# Patient Record
Sex: MALE
[De-identification: names, ages, dates, MRNs, and addresses within clinical notes are randomized per-mention and may not be internally consistent; named-entity substitution may affect disease eponyms.]

---

## 2019-04-02 ENCOUNTER — HOSPITAL ENCOUNTER (EMERGENCY)
Dept: HOSPITAL 14 - H.ER | Age: 54
Discharge: ANOTHER HOSPITAL | End: 2019-04-02
Payer: SELF-PAY

## 2019-04-02 ENCOUNTER — HOSPITAL ENCOUNTER (INPATIENT)
Dept: HOSPITAL 31 - C.9I | Age: 54
LOS: 4 days | Discharge: HOME | DRG: 174 | End: 2019-04-06
Attending: FAMILY MEDICINE | Admitting: FAMILY MEDICINE
Payer: COMMERCIAL

## 2019-04-02 VITALS — OXYGEN SATURATION: 100 %

## 2019-04-02 VITALS — HEART RATE: 83 BPM | SYSTOLIC BLOOD PRESSURE: 172 MMHG | RESPIRATION RATE: 20 BRPM | DIASTOLIC BLOOD PRESSURE: 115 MMHG

## 2019-04-02 VITALS — BODY MASS INDEX: 26.6 KG/M2

## 2019-04-02 VITALS — TEMPERATURE: 98.1 F

## 2019-04-02 DIAGNOSIS — I27.20: ICD-10-CM

## 2019-04-02 DIAGNOSIS — I37.1: ICD-10-CM

## 2019-04-02 DIAGNOSIS — E78.5: ICD-10-CM

## 2019-04-02 DIAGNOSIS — R07.89: ICD-10-CM

## 2019-04-02 DIAGNOSIS — I10: ICD-10-CM

## 2019-04-02 DIAGNOSIS — D72.829: ICD-10-CM

## 2019-04-02 DIAGNOSIS — Z79.899: ICD-10-CM

## 2019-04-02 DIAGNOSIS — E87.1: ICD-10-CM

## 2019-04-02 DIAGNOSIS — I25.82: ICD-10-CM

## 2019-04-02 DIAGNOSIS — I21.29: Primary | ICD-10-CM

## 2019-04-02 DIAGNOSIS — Z95.5: ICD-10-CM

## 2019-04-02 DIAGNOSIS — E11.9: ICD-10-CM

## 2019-04-02 DIAGNOSIS — I25.10: ICD-10-CM

## 2019-04-02 DIAGNOSIS — I07.1: ICD-10-CM

## 2019-04-02 DIAGNOSIS — I21.9: Primary | ICD-10-CM

## 2019-04-02 LAB
BASOPHILS # BLD AUTO: 0.1 K/UL (ref 0–0.2)
BASOPHILS NFR BLD: 0.6 % (ref 0–2)
BUN SERPL-MCNC: 20 MG/DL (ref 9–20)
CALCIUM SERPL-MCNC: 10.2 MG/DL (ref 8.4–10.2)
EOSINOPHIL # BLD AUTO: 0.2 K/UL (ref 0–0.7)
EOSINOPHIL NFR BLD: 1.3 % (ref 0–4)
ERYTHROCYTE [DISTWIDTH] IN BLOOD BY AUTOMATED COUNT: 13.6 % (ref 11.5–14.5)
GFR NON-AFRICAN AMERICAN: > 60
HGB BLD-MCNC: 15.7 G/DL (ref 12–18)
LYMPHOCYTES # BLD AUTO: 3.3 K/UL (ref 1–4.3)
LYMPHOCYTES NFR BLD AUTO: 21.7 % (ref 20–40)
MCH RBC QN AUTO: 30.6 PG (ref 27–31)
MCHC RBC AUTO-ENTMCNC: 33.8 G/DL (ref 33–37)
MCV RBC AUTO: 90.5 FL (ref 80–94)
MONOCYTES # BLD: 1.1 K/UL (ref 0–0.8)
MONOCYTES NFR BLD: 7.6 % (ref 0–10)
NEUTROPHILS # BLD: 10.4 K/UL (ref 1.8–7)
NEUTROPHILS NFR BLD AUTO: 68.8 % (ref 50–75)
NRBC BLD AUTO-RTO: 0.1 % (ref 0–0)
PLATELET # BLD: 263 K/UL (ref 130–400)
PMV BLD AUTO: 8.4 FL (ref 7.2–11.7)
RBC # BLD AUTO: 5.14 MIL/UL (ref 4.4–5.9)
WBC # BLD AUTO: 15.1 K/UL (ref 4.8–10.8)

## 2019-04-02 PROCEDURE — 71045 X-RAY EXAM CHEST 1 VIEW: CPT

## 2019-04-02 PROCEDURE — 82948 REAGENT STRIP/BLOOD GLUCOSE: CPT

## 2019-04-02 PROCEDURE — 93005 ELECTROCARDIOGRAM TRACING: CPT

## 2019-04-02 PROCEDURE — 4A023N7 MEASUREMENT OF CARDIAC SAMPLING AND PRESSURE, LEFT HEART, PERCUTANEOUS APPROACH: ICD-10-PCS | Performed by: INTERNAL MEDICINE

## 2019-04-02 PROCEDURE — 85025 COMPLETE CBC W/AUTO DIFF WBC: CPT

## 2019-04-02 PROCEDURE — 99284 EMERGENCY DEPT VISIT MOD MDM: CPT

## 2019-04-02 PROCEDURE — 02703D6 DILATION OF CORONARY ARTERY, ONE ARTERY, BIFURCATION, WITH INTRALUMINAL DEVICE, PERCUTANEOUS APPROACH: ICD-10-PCS | Performed by: INTERNAL MEDICINE

## 2019-04-02 PROCEDURE — 84484 ASSAY OF TROPONIN QUANT: CPT

## 2019-04-02 PROCEDURE — 80048 BASIC METABOLIC PNL TOTAL CA: CPT

## 2019-04-02 RX ADMIN — EPTIFIBATIDE SCH MLS/HR: 0.75 INJECTION, SOLUTION INTRAVENOUS at 23:30

## 2019-04-02 RX ADMIN — NITROGLYCERIN SCH MLS/HR: 200 INJECTION, SOLUTION INTRAVENOUS at 23:49

## 2019-04-02 NOTE — ED PDOC
HPI: Chest Pain


Time Seen by Provider: 19 19:21


Chief Complaint (Nursing): Chest Pain


Chief Complaint (Provider): Chest Pain


History Per: Patient


History/Exam Limitations: no limitations


Onset/Duration Of Symptoms: Days (x 1 month)


Current Symptoms Are (Timing): Still Present


Quality: "Pain"


Additional Complaint(s): 


54 year old male with HTN presents to the ED for evaluation of one month of 

chest pain. Patient was initially saying there was no change to chest pain. 

However, on further questioning, he reports pain is radiating to his back and 

admits to feel short of breath. Patient does not know the name of his HTN 

medications or the name of his doctor. No nausea, vomiting, sweats and radiation

to jaw, neck or arms. 














Past Medical History


Reviewed: Historical Data, Nursing Documentation, Vital Signs


Vital Signs: 





                                Last Vital Signs











Temp  98.1 F   19 18:50


 


Pulse  88   19 20:15


 


Resp  18   19 18:50


 


BP  177/99 H  19 20:15


 


Pulse Ox  100   19 20:15














- Medical History


PMH: HTN





- Surgical History


Surgical History: No Surg Hx





- Family History


Family History: States: Unknown Family Hx





- Allergies


Allergies/Adverse Reactions: 


                                    Allergies











Allergy/AdvReac Type Severity Reaction Status Date / Time


 


No Known Allergies Allergy   Verified 19 18:50














Review of Systems


ROS Statement: Except As Marked, All Systems Reviewed And Found Negative


Constitutional: Negative for: Fever


Cardiovascular: Positive for: Chest Pain


Respiratory: Positive for: Shortness of Breath


Gastrointestinal: Negative for: Nausea, Vomiting, Abdominal Pain


Musculoskeletal: Positive for: Back Pain.  Negative for: Neck Pain, Arm Pain





Physical Exam





- Reviewed


Nursing Documentation Reviewed: Yes


Vital Signs Reviewed: Yes





- Physical Exam


Appears: Positive for: Well, Non-toxic, No Acute Distress


Head Exam: Positive for: ATRAUMATIC, NORMAL INSPECTION, NORMOCEPHALIC


Skin: Positive for: Normal Color, Warm, Dry


Eye Exam: Positive for: EOMI, Normal appearance, PERRL


Cardiovascular/Chest: Positive for: Regular Rate, Rhythm.  Negative for: Gallop,

Murmur, Friction Rub


Respiratory: Positive for: Normal Breath Sounds.  Negative for: Wheezing, 

Respiratory Distress


Pulses-Radial (L): 2+


Pulses-Radial (R): 2+


Gastrointestinal/Abdominal: Positive for: Normal Exam, Soft.  Negative for: 

Tenderness


Extremity: Positive for: Normal ROM (x 4).  Negative for: Deformity


Neurological/Psych: Positive for: Awake, Alert, Normal Tone, Oriented (x 3).  

Negative for: Motor/Sensory Deficits





- Laboratory Results


Result Diagrams: 


                                 19 20:00





                                 19 20:00


Lab Results: 





                                        











Troponin I  37.8000 ng/mL (0.00-0.120)  H*  19  20:00    














- ECG


O2 Sat by Pulse Oximetry: 100 (RA)


Pulse Ox Interpretation: Normal





- Core Measure


Core Measure Indicators: Code Heart





- Critical Care


Total Time (In Min): 60


Documented Critical Care: Time excludes all time spent performint seperately 

billable procedures





Medical Decision Making


Medical Decision Makin:23 


MDM: cardiac workup


Initial EKG showing ST elevation V2, V3 without reciprocal change. 








20:50 


Spoke to Dr. Banerjee. Sending an image of the first EKG done at 18:46. It was read

as NSR without ST/T changes. However, there is mild ST elevation.


Obtaining repeat EKG. 





20:54 


Repeat EKG shows elevation and labs reveal troponin of 37.8. 





21:01 


Code Heart activated. Dr. Malcolm called. 





21:13 


Dr. Malcolm returned call; States he is going to review the images and call back. 







21:22 


Initially, Dr. Malcolm was not accepting patient for admission because the patient

already infarcted. However, Dr. Banerjee disagrees and states he will call Dr. Hector shultz directly as he still feels transfer is necessary. Will wait to hear back from 

either provider. 





21:25 


Dr. Banerjee returned call; advised to hold Plavix and Heparin. Patient was already

given aspirin in the ED and will go directly to the catheterization lab.





---------------------------------------

----------------------------------------------------------


Scribe Attestation:


Documented by Robina Alan, acting as a scribe Denis Pace MD 





Provider Scribe Attestation:


All medical record entries made by the Scribe were at my direction and 

personally dictated by me. I have reviewed the chart and agree that the record 

accurately reflects my personal performance of the history, physical exam, 

medical decision making, and the department course for this patient. I have also

personally directed, reviewed, and agree with the discharge instructions and 

disposition. 





Disposition





- Clinical Impression


Clinical Impression: 


 Myocardial infarct, Chest pain








- Patient ED Disposition


Is Patient to be Admitted: Transfer of Care





- Disposition


Disposition: Other Institution ([facility] cardiac catheterization)


Disposition Time: 21:25


Condition: STABLE


Forms:  [facility] (English)

## 2019-04-02 NOTE — CP.PCM.PN
<Susana Alberto P - Last Filed: 04/03/19 04:50>





Subjective





- Date & Time of Evaluation


Date of Evaluation: 04/02/19


Time of Evaluation: 21:45





- Subjective


Subjective: 


House doctor note.





Code heart transfer from [place]. Patient arrived on site at 9:33PM. Vitals: 

189/111, 87 HR, 97% SpO2. Patient complains of 5/10 left sided chest pain. Given

Aspirin 325mg and HCTZ 25mg in [place] ED. Once arrived, orders were placed for 

Brillenta 180mg PO, Heparin bolus 4000mg IV and Heparin drip. Nitroglycerin drip

@ 50mcg initiated.  Dr. Mas arrived at Cath lab 10:13. Cath showed occlusion 

of LAD. Patient accepted to ICU.





HPI: 54 year old male with L chest pain for one month. Worsens with exertion and

at night. Associated symptoms include shortness of breath. Denies 

lightheadedness, radiation of pain, nausea, vomiting, diaphoresis, syncope, 

abdominal pain, and any other symptoms. Patient was seen at a clinic a few days 

ago, was diagnosed with HTN and prescribed BP medication, which he began taking.

Chest pain severely worsened today which prompted him to go to [place] ED. EKG 

showed ST changes. Troponin was 37.8. 





PMHx: HTN


PSHx: Denies


Meds: Unknown BP med


Allergies: NKDA


Social: occassional alcohol use, denies tobacco and illicit drugs. Works as a 

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


Family Hx: Denies hx of CAD, HTN, MI, DM, stroke and cancer























Objective





- Constitutional


Appears: Non-toxic





- Head Exam


Head Exam: ATRAUMATIC, NORMOCEPHALIC





- Eye Exam


Eye Exam: EOMI, Normal appearance, PERRL





- ENT Exam


ENT Exam: Mucous Membranes Moist





- Neck Exam


Neck Exam: Full ROM, Normal Inspection





- Respiratory Exam


Respiratory Exam: Clear to Ausculation Bilateral.  absent: Rales, Rhonchi, 

Wheezes





- Cardiovascular Exam


Cardiovascular Exam: REGULAR RHYTHM, +S1, +S2





- GI/Abdominal Exam


GI & Abdominal Exam: Soft, Normal Bowel Sounds.  absent: Guarding, Rigid, 

Tenderness, Rebound





- Extremities Exam


Extremities Exam: Full ROM, Normal Capillary Refill, Normal Inspection.  absent:

Pedal Edema, Tenderness





- Neurological Exam


Neurological Exam: Alert, Awake, CN II-XII Intact, Oriented x3





- Psychiatric Exam


Psychiatric exam: Normal Affect, Normal Mood





- Skin


Skin Exam: Dry, Intact, Normal Color





<Ke De Oliveira P - Last Filed: 04/03/19 08:27>





Objective





- Vital Signs/Intake and Output


Vital Signs (last 24 hours): 


                                        











Temp Pulse Resp BP Pulse Ox


 


 99.1 F   82   17   114/72   96 


 


 04/03/19 08:00  04/03/19 08:05  04/03/19 08:05  04/03/19 08:05  04/03/19 08:05








Intake and Output: 


                                        











 04/03/19 04/03/19





 06:59 18:59


 


Intake Total 177.2 45.6


 


Output Total 1200 


 


Balance -1022.8 45.6














- Medications


Medications: 


                               Current Medications





Acetaminophen (Tylenol 325mg Tab)  650 mg PO Q6 PRN


   PRN Reason: Fever >100.4 F


Aspirin (Aspirin Chewable)  81 mg PO DAILY [facility]


Famotidine (Pepcid)  20 mg PO BID [facility]


   Last Admin: 04/02/19 23:50 Dose:  20 mg


Nitroglycerin/Dextrose (Nitroglycerin 50 Mg/250 Ml D5w)  50 mg in 250 mls @ 15 

mls/hr IV .[id number] [facility]; Protocol


   Last Titration: 04/03/19 07:40 Dose:  0 mcg/min, 0 mls/hr


Eptifibatide (Integrilin)  75 mg in 100 mls @ 11.612 mls/hr IV .Q8H37M [facility]


   Stop: 04/03/19 23:30


   Last Admin: 04/03/19 06:30 Dose:  11.612 mls/hr


Lisinopril (Zestril)  2.5 mg PO DAILY [facility]


Metoprolol Tartrate (Lopressor)  12.5 mg PO Q12 [facility]


   Last Admin: 04/02/19 23:50 Dose:  12.5 mg


Rosuvastatin Calcium (Crestor)  20 mg PO HS [facility]


Ticagrelor (Brilinta)  90 mg PO BID [facility]











- Labs


Labs: 


                                        





                                 04/03/19 05:52 





                                 04/03/19 05:52 











Attending/Attestation





- Attestation


I have personally seen and examined this patient.: Yes


I have fully participated in the care of the patient.: Yes


I have reviewed all pertinent clinical information, including history, physical 

exam and plan: Yes


Notes (Text): 





04/03/19 08:27


Patient seen during code heart transfer form [facility], and other details in the HPI 

note as well.

## 2019-04-02 NOTE — CP.PCM.CON
History of Present Illness





- History of Present Illness


History of Present Illness: 


CCM


55 yo male with hx HTN had chest pain x 1 month and went to [facility]. Pt

found with EKG changes and elevated trop. Transferred to  [facility]. 

Cardiac Cath done showing 100% LAD and pt received BMS to LAD. Pt has been given

asa /brilinta /morphine and is on integrilin drip. Pt pain free when seen. 

Denied sob /n /v /cough. 


ROS- as noted


All- NKDA


Social Etoh occ/ No tob or drugs


Meds- reviewed


FH- Unknown





PE  T-98.1     P-98  R-16  BP-138/97


Awake ,responsive, nad


Neck- no jvd


Lungs- bilat bs


Heart-rr


aBd-benign


eXt- no edema, pulses intact bilat





Labs-pending


A&P


STEMI


s/p Cardiac Cath /PCI /BMS to LAD


HTN





cont asa/Brilinta/ Integrilin 24 hrs per Cardiology


start Beta blocker /ACE


ECHO


Cardiology f/u


check labs /CXR


DVT prophylaxis


maintain optimal lytes








Past Patient History





- Past Social History


Smoking Status: Never Smoked





- CARDIAC


Hx Hypertension: Yes





- PSYCHIATRIC


Hx Substance Use: No





Meds


Allergies/Adverse Reactions: 


                                    Allergies











Allergy/AdvReac Type Severity Reaction Status Date / Time


 


No Known Allergies Allergy   Verified 04/02/19 18:50














- Medications


Medications: 


                               Current Medications





Heparin Sodium/Sodium Chloride (Heparin 18967 Units/250ml 1/2 Normal Saline)  

25,000 units in 250 mls @ 8.709 mls/hr IV .Q24H [facility]N; Protocol


   PRN Reason: ADJUST RATE PER PROTOCOL


Nitroglycerin/Dextrose (Nitroglycerin 50 Mg/250 Ml D5w)  50 mg in 250 mls @ 15 

mls/hr IV .[id number] [facility]; Protocol











Assessment & Plan


(1) Myocardial infarct


Status: Acute   





(2) HTN (hypertension)


Status: Chronic

## 2019-04-03 LAB
ALBUMIN SERPL-MCNC: 4.4 G/DL (ref 3.5–5)
ALBUMIN/GLOB SERPL: 1.4 {RATIO} (ref 1–2.1)
ALT SERPL-CCNC: 93 U/L (ref 21–72)
AST SERPL-CCNC: 669 U/L (ref 17–59)
BASOPHILS # BLD AUTO: 0.1 K/UL (ref 0–0.2)
BASOPHILS NFR BLD: 0.5 % (ref 0–2)
BUN SERPL-MCNC: 15 MG/DL (ref 9–20)
CALCIUM SERPL-MCNC: 9 MG/DL (ref 8.6–10.4)
EOSINOPHIL # BLD AUTO: 0 K/UL (ref 0–0.7)
EOSINOPHIL NFR BLD: 0.2 % (ref 0–4)
ERYTHROCYTE [DISTWIDTH] IN BLOOD BY AUTOMATED COUNT: 13.3 % (ref 11.5–14.5)
GFR NON-AFRICAN AMERICAN: > 60
HDLC SERPL-MCNC: 42 MG/DL (ref 30–70)
HGB BLD-MCNC: 14.1 G/DL (ref 12–18)
LDLC SERPL-MCNC: 141 MG/DL (ref 0–129)
LYMPHOCYTES # BLD AUTO: 2.3 K/UL (ref 1–4.3)
LYMPHOCYTES NFR BLD AUTO: 16.6 % (ref 20–40)
MCH RBC QN AUTO: 31.5 PG (ref 27–31)
MCHC RBC AUTO-ENTMCNC: 34.5 G/DL (ref 33–37)
MCV RBC AUTO: 91.1 FL (ref 80–94)
MONOCYTES # BLD: 1.2 K/UL (ref 0–0.8)
MONOCYTES NFR BLD: 8.4 % (ref 0–10)
NEUTROPHILS # BLD: 10.5 K/UL (ref 1.8–7)
NEUTROPHILS NFR BLD AUTO: 74.3 % (ref 50–75)
NRBC BLD AUTO-RTO: 0.1 % (ref 0–2)
PLATELET # BLD: 251 K/UL (ref 130–400)
PMV BLD AUTO: 8.5 FL (ref 7.2–11.7)
RBC # BLD AUTO: 4.48 MIL/UL (ref 4.4–5.9)
WBC # BLD AUTO: 14.2 K/UL (ref 4.8–10.8)

## 2019-04-03 RX ADMIN — INSULIN ASPART SCH UNIT: 100 INJECTION, SOLUTION INTRAVENOUS; SUBCUTANEOUS at 17:19

## 2019-04-03 RX ADMIN — INSULIN ASPART SCH: 100 INJECTION, SOLUTION INTRAVENOUS; SUBCUTANEOUS at 21:30

## 2019-04-03 RX ADMIN — EPTIFIBATIDE SCH: 0.75 INJECTION, SOLUTION INTRAVENOUS at 08:47

## 2019-04-03 RX ADMIN — EPTIFIBATIDE SCH MLS/HR: 0.75 INJECTION, SOLUTION INTRAVENOUS at 06:30

## 2019-04-03 RX ADMIN — NITROGLYCERIN SCH: 200 INJECTION, SOLUTION INTRAVENOUS at 19:46

## 2019-04-03 RX ADMIN — INSULIN ASPART SCH UNIT: 100 INJECTION, SOLUTION INTRAVENOUS; SUBCUTANEOUS at 11:57

## 2019-04-03 RX ADMIN — EPTIFIBATIDE SCH: 0.75 INJECTION, SOLUTION INTRAVENOUS at 19:48

## 2019-04-03 RX ADMIN — EPTIFIBATIDE SCH MLS/HR: 0.75 INJECTION, SOLUTION INTRAVENOUS at 13:22

## 2019-04-03 NOTE — CP.PCM.HP
<Susana Alberto P - Last Filed: 04/03/19 07:03>





History of Present Illness





- History of Present Illness


History of Present Illness: 





H&P for Dr. De Oliveira





HPI: 54 year old male with L chest pain for one month. Worsens with exertion and

at night. Associated symptoms include shortness of breath. Denies 

lightheadedness, radiation of pain, nausea, vomiting, diaphoresis, syncope, 

abdominal pain, and any other symptoms. Patient was seen at a clinic a few days 

ago, was diagnosed with HTN and prescribed BP medication, which he began taking.

Chest pain severely worsened today which prompted him to go to [place] ED. EKG 

showed ST changes. Troponin was 37.8. Underwent Cardiac cath with Dr. Mas, 

which showed 100% LAD occlusion.





PMHx: HTN


PSHx: Denies


Meds: Unknown BP med


Allergies: NKDA


Social: occasional alcohol use, denies tobacco and illicit drugs. Works as a 

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


Family Hx: Denies hx of CAD, HTN, MI, DM, stroke and cancer





Review of Systems:


-Gen: No fever, No chills, No headache, No lethargy, No weakness.


-HEENT: No dizziness, No change in vision, No change in hearing, No sore throat,

No dysphagia, No nasal congestion, No mucous.


-Cardio: + chest pain, No palpitations, No lower extremity edema, No orthopnea.


-Resp: No  cough, + dyspnea, No hemoptysis, No wheezing, No pain on inspiration.


-GI: No abdominal pain, No nausea/vomiting, No diarrhea/constipation, No 

hematochezia, No hematemesis.


-: No dysuria, No urinary freq, No incontinence, No hematuria, No change in 

urinary stream.


-MSK: No back pain, No muscle weakness, No radiating pain.


-Skin: No itching, No rash, No lesions.


-Neuro: No confusion, No numbness, No tingling, No focal weakness, No radicular 

pain, No syncope.





Present on Admission





- Present on Admission


Any Indicators Present on Admission: No





Past Patient History





- Past Medical History & Family History


Past Medical History?: Yes





- Past Social History


Smoking Status: Never Smoked





- CARDIAC


Hx Hypertension: Yes





- PULMONARY


Hx Respiratory Disorders: No





- NEUROLOGICAL


Hx Neurological Disorder: No





- HEENT


Hx HEENT Problems: No





- RENAL


Hx Chronic Kidney Disease: No





- ENDOCRINE/METABOLIC


Hx Endocrine Disorders: No





- HEMATOLOGICAL/ONCOLOGICAL


Hx Blood Disorders: No





- INTEGUMENTARY


Hx Dermatological Problems: No





- MUSCULOSKELETAL/RHEUMATOLOGICAL


Hx Musculoskeletal Disorders: No





- GASTROINTESTINAL


Hx Gastrointestinal Disorders: No





- GENITOURINARY/GYNECOLOGICAL


Hx Genitourinary Disorders: No





- PSYCHIATRIC


Hx Substance Use: No





- SURGICAL HISTORY


Hx Surgeries: No





- ANESTHESIA


Hx Anesthesia: Yes


Hx Anesthesia Reactions: No


Hx Malignant Hyperthermia: No


Has any member of the family had a problem w/ anesthesia?: No





Meds


Allergies/Adverse Reactions: 


                                    Allergies











Allergy/AdvReac Type Severity Reaction Status Date / Time


 


No Known Allergies Allergy   Verified 04/02/19 18:50














Physical Exam





- Additional Findings


Additional findings: 


- Constitutional


Appears: Non-toxic





- Head Exam


Head Exam: ATRAUMATIC, NORMOCEPHALIC





- Eye Exam


Eye Exam: EOMI, Normal appearance, PERRL





- ENT Exam


ENT Exam: Mucous Membranes Moist





- Neck Exam


Neck Exam: Full ROM, Normal Inspection





- Respiratory Exam


Respiratory Exam: Clear to Ausculation Bilateral.  absent: Rales, Rhonchi, 

Wheezes





- Cardiovascular Exam


Cardiovascular Exam: REGULAR RHYTHM, +S1, +S2





- GI/Abdominal Exam


GI & Abdominal Exam: Soft, Normal Bowel Sounds.  absent: Guarding, Rigid, 

Tenderness, Rebound





- Extremities Exam


Extremities Exam: Full ROM, Normal Capillary Refill, Normal Inspection.  absent:

Pedal Edema, Tenderness





- Neurological Exam


Neurological Exam: Alert, Awake, CN II-XII Intact, Oriented x3





- Psychiatric Exam


Psychiatric exam: Normal Affect, Normal Mood





- Skin


Skin Exam: Dry, Intact, Normal Color








Results





- Vital Signs


Recent Vital Signs: 





                                Last Vital Signs











Temp  98.7 F   04/02/19 23:45


 


Pulse  87   04/02/19 23:49


 


Resp  20   04/02/19 23:49


 


BP  138/97 H  04/02/19 23:49


 


Pulse Ox  97   04/02/19 23:49














- Labs


Result Diagrams: 


                                 04/03/19 05:52





                                 04/03/19 05:52





Assessment & Plan





- Assessment and Plan (Free Text)


Plan: 


STEMI s/p cardiac cath


% occlusion of LAD


f/u Echo


f/u troponin


Tylenol 650mg PO Q6H PRN 


Aspirin 81mg PO daily 


Crestor 20mg PO HS 


Brillinta 90mg PO BID





HTN


Metoprolol 12.5 PO Q12H 


Nitro drip 


Lisinopril 2.5mg PO daily 





PPx


Pepcid 20mg BId 


Brillinta 90mg PO BID





Case discussed with Dr. Yennifer Alberto, PGY-1


 





<Ke De Oliveira - Last Filed: 04/03/19 08:09>





Results





- Vital Signs


Recent Vital Signs: 





                                Last Vital Signs











Temp  98.7 F   04/03/19 04:30


 


Pulse  70   04/03/19 07:04


 


Resp  19   04/03/19 07:04


 


BP  101/60   04/03/19 07:04


 


Pulse Ox  93 L  04/03/19 07:04














- Labs


Result Diagrams: 


                                 04/03/19 05:52





                                 04/03/19 05:52


Labs: 





                         Laboratory Results - last 24 hr











  04/03/19 04/03/19





  05:52 05:52


 


WBC  14.2 H 


 


RBC  4.48 


 


Hgb  14.1 


 


Hct  40.8 


 


MCV  91.1 


 


MCH  31.5 H 


 


MCHC  34.5 


 


RDW  13.3 


 


Plt Count  251 


 


MPV  8.5 


 


Neut % (Auto)  74.3 


 


Lymph % (Auto)  16.6 L 


 


Mono % (Auto)  8.4 


 


Eos % (Auto)  0.2 


 


Baso % (Auto)  0.5 


 


Neut # (Auto)  10.5 H 


 


Lymph # (Auto)  2.3 


 


Mono # (Auto)  1.2 H 


 


Eos # (Auto)  0.0 


 


Baso # (Auto)  0.1 


 


Sodium   130 L


 


Potassium   3.0 L


 


Chloride   94 L


 


Carbon Dioxide   27


 


Anion Gap   12


 


BUN   15


 


Creatinine   0.7 L


 


Est GFR ( Amer)   > 60


 


Est GFR (Non-Af Amer)   > 60


 


Random Glucose   149 H


 


Calcium   9.0


 


Phosphorus   4.3


 


Magnesium   1.7


 


Total Bilirubin   1.0


 


AST   669 H


 


ALT   93 H


 


Alkaline Phosphatase   96


 


Troponin I   139.0000 H*


 


Total Protein   7.5


 


Albumin   4.4


 


Globulin   3.1


 


Albumin/Globulin Ratio   1.4


 


Triglycerides   143


 


Cholesterol   210 H


 


LDL Cholesterol Direct   141 H


 


HDL Cholesterol   42














Attending/Attestation





- Attestation


I have personally seen and examined this patient.: Yes


I have fully participated in the care of the patient.: Yes


I have reviewed all pertinent clinical information: Yes


Notes (Text): 





04/03/19 08:06


Assessment/Plan


Anterioseptal STEMI with also RCA lession, s/p bare metal pci, on integrillin 

for 24 hr due to osteal LAD disease, ASA, brillianta, crestor, metoprolol, echo,

admitted to ICU, staged intervention depending on the RCA disease.

## 2019-04-03 NOTE — CARD
--------------- APPROVED REPORT --------------





Date of service: 04/02/2019



EKG Measurement

Heart [id number]

IA [id number]

[id number]

[id number]

[id number]



<Conclusion>

Normal sinus rhythm

Normal ECG

## 2019-04-03 NOTE — CP.CCUPN
<Wander,Adonis - Last Filed: 04/03/19 12:07>





CCU Subjective





- Physician Review


Subjective (Free Text): 





04/03/19 07:34





S/p Cardiac Cath yesterday. No acute events overnight. Pt seen and examined this

morning. Denies chest discomfort, palpitations, lightheadedness.  





Critical Care Time Spent (in minutes): 35





CCU Objective





- Vital Signs / Intake & Output


Vital Signs (Last 4 hours): 


Vital Signs











  Temp Pulse Resp BP Pulse Ox


 


 04/03/19 07:04   70  19  101/60  93 L


 


 04/03/19 07:00   72  20   95


 


 04/03/19 06:34     104/65 


 


 04/03/19 06:30   73  20  104/65 


 


 04/03/19 06:04   78  15  112/70  95


 


 04/03/19 06:00   78  18   98


 


 04/03/19 05:34   68  34 H  104/59 L  97


 


 04/03/19 05:33   69  35 H  115/60  97


 


 04/03/19 05:30   76  21  104/59 L 


 


 04/03/19 05:05     121/69 


 


 04/03/19 05:00   73  19   97


 


 04/03/19 04:34   74  13  105/69  97


 


 04/03/19 04:30  98.7 F  72  24  105/69 


 


 04/03/19 04:05   74  22  114/71  98


 


 04/03/19 04:00  98.5 F  74  26 H  93/53 L  98











Intake and Output (Last 8hrs): 


                                 Intake & Output











 04/02/19 04/03/19 04/03/19





 22:59 06:59 14:59


 


Intake Total  177.2 17.6


 


Output Total  1200 


 


Balance  -1022.8 17.6


 


Weight 160 lb 163 lb 0.9 oz 


 


Intake:   


 


  IV  42 


 


  Intake, IV Amount  135.2 17.6


 


    Left Forearm  54 6


 


    left arm  81.2 11.6


 


Output:   


 


  Urine  1200 


 


    Urine, Voided  1200 


 


Other:   


 


  Voiding Method  Urinal 


 


  # Voids   


 


    Urine, Voided  1 0


 


  # Bowel Movements  0 0














- Physical Exam


Physical Exam Limitations: Negative for: Altered Mental Status


Pupils: Positive for: PERRL


Extroacular Muscles: Positive for: EOMI


Mouth: Positive for: Moist Mucous Membranes


Neck: Negative for: JVD


Respiratory/Chest: Positive for: Clear to Auscultation.  Negative for: 

Respiratory Distress, Accessory Muscle Use, Wheezes, Rales, Retracting, Rhonchi


Cardiovascular: Positive for: Regular Rate and Rhythm, Normal S1, S2.  Negative 

for: Murmurs


Abdomen: Positive for: Normal Bowel Sounds.  Negative for: Tenderness, 

Distention, Guarding


Lower Extremity: Positive for: Normal Inspection, NORMAL PULSES, Neurovascularly

Intact, Capillary Refill < 2 s.  Negative for: Edema, Erythema, Temperature 

Abnormalties


Neurological: Positive for: Speech Normal


Skin: Positive for: Normal Color


Psychiatric: Positive for: Alert, Oriented x 3





- Medications


Active Medications: 


Active Medications











Generic Name Dose Route Start Last Admin





  Trade Name Freq  PRN Reason Stop Dose Admin


 


Acetaminophen  650 mg  04/02/19 23:44  





  Tylenol 325mg Tab  PO   





  Q6 PRN   





  Fever >100.4 F   





     





     





     


 


Aspirin  81 mg  04/03/19 10:00  





  Aspirin Chewable  PO   





  DAILY SHAGGY   





     





     





     





     


 


Famotidine  20 mg  04/02/19 23:45  04/02/19 23:50





  Pepcid  PO   20 mg





  BID SHAGGY   Administration





     





     





     





     


 


Nitroglycerin/Dextrose  50 mg in 250 mls @ 15 mls/hr  04/02/19 22:00  04/03/19 

04:00





  Nitroglycerin 50 Mg/250 Ml D5w  IV   20 mcg/min





  .[id number] SHAGGY   6 mls/hr





     Titration





     





  Protocol   





  50 MCG/MIN   


 


Eptifibatide  75 mg in 100 mls @ 11.612 mls/hr  04/02/19 23:30  04/03/19 06:30





  Integrilin  IV  04/03/19 23:30  11.612 mls/hr





  .Q8H37M SHAGGY   Administration





     





     





     





  2 MCG/KG/MIN   


 


Lisinopril  2.5 mg  04/03/19 10:00  





  Zestril  PO   





  DAILY SHAGGY   





     





     





     





     


 


Metoprolol Tartrate  12.5 mg  04/02/19 23:45  04/02/19 23:50





  Lopressor  PO   12.5 mg





  Q12 SHAGGY   Administration





     





     





     





     


 


Rosuvastatin Calcium  20 mg  04/03/19 22:00  





  Crestor  PO   





  HS SHAGGY   





     





     





     





     


 


Ticagrelor  90 mg  04/03/19 10:00  





  Brilinta  PO   





  BID SHAGGY   





     





     





     





     














- Patient Studies


Lab Studies: 


                                   Lab Studies











  04/03/19 04/03/19 Range/Units





  05:52 05:52 


 


WBC   14.2 H  (4.8-10.8)  K/uL


 


RBC   4.48  (4.40-5.90)  Mil/uL


 


Hgb   14.1  (12.0-18.0)  g/dL


 


Hct   40.8  (35.0-51.0)  %


 


MCV   91.1  (80.0-94.0)  fL


 


MCH   31.5 H  (27.0-31.0)  pg


 


MCHC   34.5  (33.0-37.0)  g/dL


 


RDW   13.3  (11.5-14.5)  %


 


Plt Count   251  (130-400)  K/uL


 


MPV   8.5  (7.2-11.7)  fL


 


Neut % (Auto)   74.3  (50.0-75.0)  %


 


Lymph % (Auto)   16.6 L  (20.0-40.0)  %


 


Mono % (Auto)   8.4  (0.0-10.0)  %


 


Eos % (Auto)   0.2  (0.0-4.0)  %


 


Baso % (Auto)   0.5  (0.0-2.0)  %


 


Neut # (Auto)   10.5 H  (1.8-7.0)  K/uL


 


Lymph # (Auto)   2.3  (1.0-4.3)  K/uL


 


Mono # (Auto)   1.2 H  (0.0-0.8)  K/uL


 


Eos # (Auto)   0.0  (0.0-0.7)  K/uL


 


Baso # (Auto)   0.1  (0.0-0.2)  K/uL


 


Sodium  130 L   (132-148)  mmol/L


 


Potassium  3.0 L   (3.6-5.2)  mmol/L


 


Chloride  94 L   ()  mmol/L


 


Carbon Dioxide  27   (22-30)  mmol/L


 


Anion Gap  12   (10-20)  


 


BUN  15   (9-20)  mg/dL


 


Creatinine  0.7 L   (0.8-1.5)  mg/dL


 


Est GFR (African Amer)  > 60   


 


Est GFR (Non-Af Amer)  > 60   


 


Random Glucose  149 H   ()  mg/dL


 


Calcium  9.0   (8.6-10.4)  mg/dl


 


Phosphorus  4.3   (2.5-4.5)  mg/dL


 


Magnesium  1.7   (1.6-2.3)  mg/dL


 


Total Bilirubin  1.0   (0.2-1.3)  mg/dL


 


AST  669 H   (17-59)  U/L


 


ALT  93 H   (21-72)  U/L


 


Alkaline Phosphatase  96   ()  U/L


 


Troponin I  139.0000 H*   (0.00-0.120)  ng/mL


 


Total Protein  7.5   (6.3-8.3)  g/dL


 


Albumin  4.4   (3.5-5.0)  g/dL


 


Globulin  3.1   (2.2-3.9)  gm/dL


 


Albumin/Globulin Ratio  1.4   (1.0-2.1)  


 


Triglycerides  143   (0-149)  mg/dL


 


Cholesterol  210 H   (0-199)  mg/dL


 


LDL Cholesterol Direct  141 H   (0-129)  mg/dL


 


HDL Cholesterol  42   (30-70)  mg/dL








                         Laboratory Results - last 24 hr











  04/03/19 04/03/19





  05:52 05:52


 


WBC  14.2 H 


 


RBC  4.48 


 


Hgb  14.1 


 


Hct  40.8 


 


MCV  91.1 


 


MCH  31.5 H 


 


MCHC  34.5 


 


RDW  13.3 


 


Plt Count  251 


 


MPV  8.5 


 


Neut % (Auto)  74.3 


 


Lymph % (Auto)  16.6 L 


 


Mono % (Auto)  8.4 


 


Eos % (Auto)  0.2 


 


Baso % (Auto)  0.5 


 


Neut # (Auto)  10.5 H 


 


Lymph # (Auto)  2.3 


 


Mono # (Auto)  1.2 H 


 


Eos # (Auto)  0.0 


 


Baso # (Auto)  0.1 


 


Sodium   130 L


 


Potassium   3.0 L


 


Chloride   94 L


 


Carbon Dioxide   27


 


Anion Gap   12


 


BUN   15


 


Creatinine   0.7 L


 


Est GFR ( Amer)   > 60


 


Est GFR (Non-Af Amer)   > 60


 


Random Glucose   149 H


 


Calcium   9.0


 


Phosphorus   4.3


 


Magnesium   1.7


 


Total Bilirubin   1.0


 


AST   669 H


 


ALT   93 H


 


Alkaline Phosphatase   96


 


Troponin I   139.0000 H*


 


Total Protein   7.5


 


Albumin   4.4


 


Globulin   3.1


 


Albumin/Globulin Ratio   1.4


 


Triglycerides   143


 


Cholesterol   210 H


 


LDL Cholesterol Direct   141 H


 


HDL Cholesterol   42











EKG/Cardiology Studies: 


Cardiology / EKG Studies





04/02/19 21:50


EKG [ELECTROCARDIOGRAM] Stat 


   Comment: 


   Mode Of Transportation: 


   Reason For Exam: chest pain





04/03/19 07:36


EKG [ELECTROCARDIOGRAM] Routine 


   Comment: 


   Mode Of Transportation: BED


   Reason For Exam: post stent proximal LAD














Critical Care Progress Note





- Nutrition


Nutrition: 


                                    Nutrition











 Category Date Time Status


 


 Heart Healthy Diet [DIET] Diets  04/03/19 Breakfast Active














Assessment/Plan





- Assessment and Plan (Free Text)


Assessment: 


54 year old male with hx of HTN (newly diagnosed), transferred from [place] 

[facility]. Pt had presented to [facility] ER with worsening chest pain ongoing for 

the past month associated with sob. Was noted on EKG to have ST elevated 

consistent with Anteroseptal infract and Troponin of 37. Pt had underwent 

cardiac cath on 04/02 with Dr. Trimble which showed % occlusion of LAD. Seen

 and evaluated in the am. Noted to be sleeping comfortably. Denies any chest 

pain or sob. 





Neuro


- AOx3


- No gross deficits





Cardiac


- Hemodynamically stable


- STEMI s/p cardiac cath on 04/02


- C/W ASA, Crestor, Brillinta, Metoprolol, Nitro ggt, and Integrillin gg


- Troponin 139 this am


- F/U ECHO today


- Cardiology on board, recs appreciated





Pulm


- Saturating 9% on rm air





GI


- Tolerating diet this am


- Cardiac and Diabetic diet





Renal


- BUN/Crea 15/0.7


- Potassium 3.0. Repleted with KDUR 40


- Na 130, Cl 94. Hyponatremia likely due to neurohormonal activation; poor 

prognostic indicator





Endocrine


- Hga1C 6.6. Newly diagnosed diabetic


- Accucheck ACHS, ISS low dose, Hypoglycemic protocol 





Heme/Onc


- Hg/Hct 14.1/40.8


- Platelets 251





PPX


-Famotidine for GI ppx


-S/P Heparin 4000 units IV on 04/02


-Will discuss anticogulation for dvt ppx with cardio team





Pt seen, examined with, and plan discussed with Dr. Ascencio, attending physician.


Adonis Viramontes, PGY2














<Laron Ascencio S - Last Filed: 04/03/19 15:45>





CCU Objective





- Vital Signs / Intake & Output


Vital Signs (Last 4 hours): 


Vital Signs











  Temp Pulse Resp BP Pulse Ox


 


 04/03/19 13:35   83  13  105/65  97


 


 04/03/19 13:08   82  14  114/70  95


 


 04/03/19 13:00   93 H  19   96


 


 04/03/19 12:35   83  12  124/72  95


 


 04/03/19 12:04   77  31 H  102/62  95


 


 04/03/19 12:00  98.7 F  77  32 H  102/62  97











Intake and Output (Last 8hrs): 


                                 Intake & Output











 04/03/19 04/03/19 04/03/19





 06:59 14:59 22:59


 


Intake Total 177.2 959.2 


 


Output Total 1200 725 


 


Balance -1022.8 234.2 


 


Weight 163 lb 0.9 oz  


 


Intake:   


 


  IV 42 28 


 


  Intake, IV Amount 135.2 91.2 


 


    Left Forearm 54 10 


 


    left arm 81.2 81.2 


 


  Oral  840 


 


Output:   


 


  Urine 1200 725 


 


    Urine, Voided 1200 725 


 


  Emesis  0 


 


Other:   


 


  Voiding Method Urinal  


 


  # Voids   


 


    Urine, Voided 1 1 


 


  # Bowel Movements 0 0 














- Medications


Active Medications: 


Active Medications











Generic Name Dose Route Start Last Admin





  Trade Name Freq  PRN Reason Stop Dose Admin


 


Acetaminophen  650 mg  04/02/19 23:44  





  Tylenol 325mg Tab  PO   





  Q6 PRN   





  Fever >100.4 F   





     





     





     


 


Aspirin  81 mg  04/03/19 10:00  04/03/19 09:14





  Aspirin Chewable  PO   81 mg





  DAILY SHAGGY   Administration





     





     





     





     


 


Dextrose  0 ml  04/03/19 10:21  





  Dextrose 50% Inj  IV   





  STAT PRN   





  Hypoglycemia Protocol   





     





  Protocol   





     


 


Dextrose  0 gm  04/03/19 10:21  





  Glutose 15  PO   





  ONCE PRN   





  Hypoglycemia Protocol   





     





  Protocol   





     


 


Famotidine  20 mg  04/02/19 23:45  04/03/19 09:13





  Pepcid  PO   20 mg





  BID SHAGGY   Administration





     





     





     





     


 


Glucagon  0 mg  04/03/19 10:21  





  Glucagen Diagnostic Kit  IM   





  STAT PRN   





  Hypoglycemia Protocol   





     





  Protocol   





     


 


Nitroglycerin/Dextrose  50 mg in 250 mls @ 15 mls/hr  04/02/19 22:00  04/03/19 

07:40





  Nitroglycerin 50 Mg/250 Ml D5w  IV   0 mcg/min





  .[id number] SHAGGY   0 mls/hr





     Titration





     





  Protocol   





  50 MCG/MIN   


 


Eptifibatide  75 mg in 100 mls @ 11.612 mls/hr  04/02/19 23:30  04/03/19 13:22





  Integrilin  IV  04/03/19 23:30  11.612 mls/hr





  .Q8H37M SHAGGY   Administration





     





     





     





  2 MCG/KG/MIN   


 


Insulin Aspart  0 unit  04/03/19 11:30  04/03/19 11:57





  Novolog  SC   1 unit





  ACHS SHAGGY   Administration





     





     





  Protocol   





     


 


Lisinopril  2.5 mg  04/03/19 10:00  04/03/19 09:14





  Zestril  PO   2.5 mg





  DAILY SHAGGY   Administration





     





     





     





     


 


Metoprolol Tartrate  12.5 mg  04/02/19 23:45  04/03/19 09:13





  Lopressor  PO   12.5 mg





  Q12 SHAGGY   Administration





     





     





     





     


 


Rosuvastatin Calcium  20 mg  04/03/19 22:00  





  Crestor  PO   





  HS SHAGGY   





     





     





     





     


 


Ticagrelor  90 mg  04/03/19 10:00  04/03/19 09:14





  Brilinta  PO   90 mg





  BID SHAGGY   Administration





     





     





     





     














- Patient Studies


Lab Studies: 


                                   Lab Studies











  04/03/19 04/03/19 04/03/19 Range/Units





  07:30 05:52 05:52 


 


WBC    14.2 H  (4.8-10.8)  K/uL


 


RBC    4.48  (4.40-5.90)  Mil/uL


 


Hgb    14.1  (12.0-18.0)  g/dL


 


Hct    40.8  (35.0-51.0)  %


 


MCV    91.1  (80.0-94.0)  fL


 


MCH    31.5 H  (27.0-31.0)  pg


 


MCHC    34.5  (33.0-37.0)  g/dL


 


RDW    13.3  (11.5-14.5)  %


 


Plt Count    251  (130-400)  K/uL


 


MPV    8.5  (7.2-11.7)  fL


 


Neut % (Auto)    74.3  (50.0-75.0)  %


 


Lymph % (Auto)    16.6 L  (20.0-40.0)  %


 


Mono % (Auto)    8.4  (0.0-10.0)  %


 


Eos % (Auto)    0.2  (0.0-4.0)  %


 


Baso % (Auto)    0.5  (0.0-2.0)  %


 


Neut # (Auto)    10.5 H  (1.8-7.0)  K/uL


 


Lymph # (Auto)    2.3  (1.0-4.3)  K/uL


 


Mono # (Auto)    1.2 H  (0.0-0.8)  K/uL


 


Eos # (Auto)    0.0  (0.0-0.7)  K/uL


 


Baso # (Auto)    0.1  (0.0-0.2)  K/uL


 


Sodium   130 L   (132-148)  mmol/L


 


Potassium   3.0 L   (3.6-5.2)  mmol/L


 


Chloride   94 L   ()  mmol/L


 


Carbon Dioxide   27   (22-30)  mmol/L


 


Anion Gap   12   (10-20)  


 


BUN   15   (9-20)  mg/dL


 


Creatinine   0.7 L   (0.8-1.5)  mg/dL


 


Est GFR (African Amer)   > 60   


 


Est GFR (Non-Af Amer)   > 60   


 


Random Glucose   149 H   ()  mg/dL


 


Hemoglobin A1c  6.6 H    (4.2-6.5)  %


 


Calcium   9.0   (8.6-10.4)  mg/dl


 


Phosphorus   4.3   (2.5-4.5)  mg/dL


 


Magnesium   1.7   (1.6-2.3)  mg/dL


 


Total Bilirubin   1.0   (0.2-1.3)  mg/dL


 


AST   669 H   (17-59)  U/L


 


ALT   93 H   (21-72)  U/L


 


Alkaline Phosphatase   96   ()  U/L


 


Troponin I   139.0000 H*   (0.00-0.120)  ng/mL


 


Total Protein   7.5   (6.3-8.3)  g/dL


 


Albumin   4.4   (3.5-5.0)  g/dL


 


Globulin   3.1   (2.2-3.9)  gm/dL


 


Albumin/Globulin Ratio   1.4   (1.0-2.1)  


 


Triglycerides   143   (0-149)  mg/dL


 


Cholesterol   210 H   (0-199)  mg/dL


 


LDL Cholesterol Direct   141 H   (0-129)  mg/dL


 


HDL Cholesterol   42   (30-70)  mg/dL








                         Laboratory Results - last 24 hr











  04/03/19 04/03/19 04/03/19





  05:52 05:52 07:30


 


WBC  14.2 H  


 


RBC  4.48  


 


Hgb  14.1  


 


Hct  40.8  


 


MCV  91.1  


 


MCH  31.5 H  


 


MCHC  34.5  


 


RDW  13.3  


 


Plt Count  251  


 


MPV  8.5  


 


Neut % (Auto)  74.3  


 


Lymph % (Auto)  16.6 L  


 


Mono % (Auto)  8.4  


 


Eos % (Auto)  0.2  


 


Baso % (Auto)  0.5  


 


Neut # (Auto)  10.5 H  


 


Lymph # (Auto)  2.3  


 


Mono # (Auto)  1.2 H  


 


Eos # (Auto)  0.0  


 


Baso # (Auto)  0.1  


 


Sodium   130 L 


 


Potassium   3.0 L 


 


Chloride   94 L 


 


Carbon Dioxide   27 


 


Anion Gap   12 


 


BUN   15 


 


Creatinine   0.7 L 


 


Est GFR ( Amer)   > 60 


 


Est GFR (Non-Af Amer)   > 60 


 


Random Glucose   149 H 


 


Hemoglobin A1c    6.6 H


 


Calcium   9.0 


 


Phosphorus   4.3 


 


Magnesium   1.7 


 


Total Bilirubin   1.0 


 


AST   669 H 


 


ALT   93 H 


 


Alkaline Phosphatase   96 


 


Troponin I   139.0000 H* 


 


Total Protein   7.5 


 


Albumin   4.4 


 


Globulin   3.1 


 


Albumin/Globulin Ratio   1.4 


 


Triglycerides   143 


 


Cholesterol   210 H 


 


LDL Cholesterol Direct   141 H 


 


HDL Cholesterol   42 











Radiology Impressions: 


                              Radiology Impressions





Chest X-Ray  04/02/19 23:55


IMPRESSION:


No active disease. 


 


 











EKG/Cardiology Studies: 


Cardiology / EKG Studies





04/02/19 21:50


EKG [ELECTROCARDIOGRAM] Stat 


   Comment: 


   Mode Of Transportation: 


   Reason For Exam: chest pain





04/03/19 07:36


EKG [ELECTROCARDIOGRAM] Routine 


   Comment: 


   Mode Of Transportation: BED


   Reason For Exam: post stent proximal LAD














Critical Care Progress Note





- Nutrition


Nutrition: 


                                    Nutrition











 Category Date Time Status


 


 Heart Healthy Diet [DIET] Diets  04/03/19 Breakfast Active














Attending/Attestation





- Attestation


I have personally seen and examined this patient.: Yes


I have fully participated in the care of the patient.: Yes


I have reviewed all pertinent clinical information: Yes


Notes (Text): 





04/03/19 15:44


Patient seen and examined in the intensive care unit.  Case discussed with 

housestaff in the morning rounds


Status post cardiac cath and LAD stent placement


Continue Integrilin, Brilinta and aspirin


No further chest pain and hemodynamically stable

## 2019-04-03 NOTE — RAD
Date of service: 



04/03/2019



HISTORY:

 MI 



COMPARISON:

None available.



TECHNIQUE:

1 view obtained.



FINDINGS:



LUNGS:

No active pulmonary disease.



PLEURA:

No significant pleural effusion identified, no pneumothorax apparent.



CARDIOVASCULAR:

No aortic atherosclerotic calcification present.



 Normal cardiac size. No pulmonary vascular congestion. 



OSSEOUS STRUCTURES:

No significant abnormalities.



VISUALIZED UPPER ABDOMEN:

Normal.



OTHER FINDINGS:

None.



IMPRESSION:

No active disease.

## 2019-04-03 NOTE — CP.PCM.PN
Subjective





- Date & Time of Evaluation


Date of Evaluation: 04/03/19


Time of Evaluation: 08:00





- Subjective


Subjective: 





Hospitalist Progress Note





Patient was seen and examined at 8:00 AM 4/3/19 ICU Bed #6





NO translation service could be found at the time of my exam. Transport Tech 

Armand helped to translate Swedish.





54 year old  male (PMHX of recently diagnosed HTN) presented to [facility] ER with a chief complaint of left sided chest pain described

as a pressure sensation that had been going on and off for 1 month. It is worse 

with exertion and without radiation. It is associated with SOB. EKG in [place] 

ER showed ST changes, therefore Code Heart was instituted and patient 

transferred to [facility] Cardiac Cath Lab. Interventional Cardiologist Dr. Mas performed Cardiac Cath which revealed % occlusion of the LAD and 

this was stented with Bare Metal stent.





Currently upon FULL ROS:


NO chest pain/pressure


NO palpitations


NO SOB/Cough/Wheezing


NO dyshpagia/odynophagia


NO abdominal pain


NO N/V/D/C


NO burning/pain with urination


NO new changes in vision


NO new changes in hearing


NO paresthesias


Mild Headache generalized that comes and goes


NO lightheadedness/dizziness





Exam:


General: AAOX3, NAD


HEENT: NCA, EOMI, PERRLA, NO cervical/supraclavicular/submandibular 

lymphadenopathy, NO thyromegaly, Nasal Turbinates are 

moist/nonerythemtous/nonedematous


Cardio: NS1 and NS2, NO M/R/G


Resp: CTA B/L, NO R/R/W


GI: Central Obesity, Soft, NT, Liver and spleen could not be palpated, NO 

guarding/rebound tenderness


Ext: Pulses are strong and equal, Capillary Refill is 2 seconds, NO edema, Some 

mile soreness to palpation around cardiac catheterization instertion site in the

right groin however no bruising/hardness palpated


Neuro: CN II through XII are grossly intact





Assessment and Plan:





1). STEMI S/P Bare Metal Stenting of the LAD 


Status: Acute


ASA 81 mg PO 1x/day


Eptifibatide 2 mcg/kg/min through 11:30 PM 4/3/19


Lisinopril 2.5 mg PO 1x/day


Metoprolol Tartrate 12.5 mg PO Q12H


Crestor 20 mg PO HS


Brilinta 90 mg PO 2x/day


Nitroglycerin Drip


F/U 2D Echocardiogram





2). Hx HTN


Status: Chronic


Lisinopril 2.5 mg PO 1x/day


Metoprolol Tartrate 12.5 mg PO Q12H





3). Prophylaxis


Pepcid 20 mg PO 2x/day


Bilateral SCDs





Disposition:


On the Epitifibatide Drip through 11:30 PM 4/3/19


Will need further recommendations and clearance from Cardiologist Dr. Mas 

prior to discharge


Explained at length his diagnosis, importance of medication and low fat/low 

cholesterol/low salt diet adherence, and follow up with [facility]





Elbert Arevalo D.O.





Objective





- Vital Signs/Intake and Output


Vital Signs (last 24 hours): 


                                        











Temp Pulse Resp BP Pulse Ox


 


 98.7 F   82   17   114/72   96 


 


 04/03/19 04:30  04/03/19 08:05  04/03/19 08:05  04/03/19 08:05  04/03/19 08:05








Intake and Output: 


                                        











 04/03/19 04/03/19





 06:59 18:59


 


Intake Total 177.2 45.6


 


Output Total 1200 


 


Balance -1022.8 45.6














- Medications


Medications: 


                               Current Medications





Acetaminophen (Tylenol 325mg Tab)  650 mg PO Q6 PRN


   PRN Reason: Fever >100.4 F


Aspirin (Aspirin Chewable)  81 mg PO DAILY [facility]


Famotidine (Pepcid)  20 mg PO BID [facility]


   Last Admin: 04/02/19 23:50 Dose:  20 mg


Nitroglycerin/Dextrose (Nitroglycerin 50 Mg/250 Ml D5w)  50 mg in 250 mls @ 15 

mls/hr IV .[id number] [facility]; Protocol


   Last Titration: 04/03/19 07:40 Dose:  0 mcg/min, 0 mls/hr


Eptifibatide (Integrilin)  75 mg in 100 mls @ 11.612 mls/hr IV .[id number] [facility]


   Stop: 04/03/19 23:30


   Last Admin: 04/03/19 06:30 Dose:  11.612 mls/hr


Lisinopril (Zestril)  2.5 mg PO DAILY [facility]


Metoprolol Tartrate (Lopressor)  12.5 mg PO Q12 [facility]


   Last Admin: 04/02/19 23:50 Dose:  12.5 mg


Rosuvastatin Calcium (Crestor)  20 mg PO HS [facility]


Ticagrelor (Brilinta)  90 mg PO BID [facility]











- Labs


Labs: 


                                        





                                 04/03/19 05:52 





                                 04/03/19 05:52

## 2019-04-03 NOTE — RAD
Date of service: 



04/02/2019



HISTORY:

 possible admission 



COMPARISON:

No prior. 



FINDINGS:



LUNGS:

No active pulmonary disease.



PLEURA:

No significant pleural effusion identified, no pneumothorax apparent.



CARDIOVASCULAR:

No atherosclerotic calcification present



Normal.



OSSEOUS STRUCTURES:

No significant abnormalities.



VISUALIZED UPPER ABDOMEN:

Normal.



OTHER FINDINGS:

None.



IMPRESSION:

No active disease.

## 2019-04-03 NOTE — CARD
--------------- APPROVED REPORT --------------





Date of service: 04/03/2019



EXAM: Two-dimensional and M-mode echocardiogram with Doppler and 

color Doppler.



INDICATION

Dyspnea Chest Pain STEMI, MI



RISK FACTORS

Hypertension 



2D DIMENSIONS 

IVSd1.2   (0.7-1.1cm)LVDd4.0   (3.9-5.9cm)

PWd1.2   (0.7-1.1cm)LA Jpvgha06   (18-58mL)

LVDs3.2   (2.5-4.0cm)FS (%) 21.1   %

LVEF (%)60.0   (>50%)LVEF (Francis's)61.59 %

IVC0.00 cm



M-Mode DIMENSIONS 

RVDd1.66   (2.1-3.2cm)Left Atrium (MM)3.09   (2.5-4.0cm)

IVSd1.10   (0.7-1.1cm)Aortic Root3.64   (2.2-3.7cm)

LVDd5.20   (4.0-5.6cm)Aortic Cusp Exc.2.10   (1.5-2.0cm)

PWd1.27   (0.7-1.1cm)FS (%) 31   %

LVDs3.57   (2.0-3.8cm)LVEF (%)59   (>50%)



Mitral Valve

MV E Zvklzini33.1cm/sMV A Ybdxywbv16.0cm/sE/A ratio0.8



TDI

Lateral E' Peak V6.74cm/sMedial E' Peak V5.90cm/sE/Lateral E'10.3

E/Medial E'11.7



Tricuspid Valve

TR Peak Azthbbyg395mh/sTR Peak Gr.64yhDxTELG82wsTb



 LEFT VENTRICLE 

The left ventricle is normal size.

There is mild concentric left ventricular hypertrophy.

Left ventricle systolic function is normal.

The Ejection Fraction is 60-65%.

There is normal LV segmental wall motion.

Tissue Doppler imaging reveals abnormal left ventricular diastolic 

dysfunction.



 RIGHT VENTRICLE 

The right ventricle is normal size.

There is normal right ventricular wall thickness.

The right ventricular systolic function is normal.



 ATRIA 

The left atrium size is normal.

The right atrium size is normal.

The interatrial septum is intact with no evidence for an atrial 

septal defect.



 AORTIC VALVE 

The aortic valve is normal in structure.

No aortic regurgitation is present.

There is no aortic valvular stenosis. 



 MITRAL VALVE 

The mitral valve is normal in structure.

There is no evidence of mitral valve prolapse.

There is no mitral valve stenosis.

There is no mitral valve regurgitation noted.



 TRICUSPID VALVE 

The tricuspid valve is normal in structure.

There is mild to moderate tricuspid regurgitation.

Right ventricular systolic pressure is estimated at 30-40 mmHg. 

There is mild pulmonary hypertension.



 PULMONIC VALVE 

The pulmonic valve is not well visualized.

There is mild pulmonic valvular regurgitation. 



 GREAT VESSELS 

The aortic root is normal in size.



 PERICARDIAL EFFUSION 

There is no significant pericardial effusion.



<Conclusion>

Left ventricle systolic function is normal.

The Ejection Fraction is 60-65%.

Hypertensive ehart disease.

Diastolic dysfunction.

No aortic regurgitation is present.

There is no mitral valve regurgitation noted.

There is mild to moderate tricuspid regurgitation.

There is mild pulmonary hypertension.

There is mild pulmonic valvular regurgitation.

## 2019-04-04 LAB
ALBUMIN SERPL-MCNC: 3.9 G/DL (ref 3.5–5)
ALBUMIN/GLOB SERPL: 1.3 {RATIO} (ref 1–2.1)
ALT SERPL-CCNC: 66 U/L (ref 21–72)
AST SERPL-CCNC: 229 U/L (ref 17–59)
BASOPHILS # BLD AUTO: 0.1 K/UL (ref 0–0.2)
BASOPHILS NFR BLD: 0.6 % (ref 0–2)
BUN SERPL-MCNC: 21 MG/DL (ref 9–20)
CALCIUM SERPL-MCNC: 9.2 MG/DL (ref 8.6–10.4)
EOSINOPHIL # BLD AUTO: 0.7 K/UL (ref 0–0.7)
EOSINOPHIL NFR BLD: 6 % (ref 0–4)
ERYTHROCYTE [DISTWIDTH] IN BLOOD BY AUTOMATED COUNT: 13.5 % (ref 11.5–14.5)
GFR NON-AFRICAN AMERICAN: > 60
HGB BLD-MCNC: 13.9 G/DL (ref 12–18)
LYMPHOCYTES # BLD AUTO: 3.4 K/UL (ref 1–4.3)
LYMPHOCYTES NFR BLD AUTO: 29.4 % (ref 20–40)
MCH RBC QN AUTO: 31.3 PG (ref 27–31)
MCHC RBC AUTO-ENTMCNC: 33.9 G/DL (ref 33–37)
MCV RBC AUTO: 92.4 FL (ref 80–94)
MONOCYTES # BLD: 1.4 K/UL (ref 0–0.8)
MONOCYTES NFR BLD: 12.3 % (ref 0–10)
NEUTROPHILS # BLD: 5.9 K/UL (ref 1.8–7)
NEUTROPHILS NFR BLD AUTO: 51.7 % (ref 50–75)
NRBC BLD AUTO-RTO: 0.1 % (ref 0–2)
PLATELET # BLD: 224 K/UL (ref 130–400)
PMV BLD AUTO: 8.7 FL (ref 7.2–11.7)
RBC # BLD AUTO: 4.43 MIL/UL (ref 4.4–5.9)
WBC # BLD AUTO: 11.5 K/UL (ref 4.8–10.8)

## 2019-04-04 RX ADMIN — INSULIN ASPART SCH: 100 INJECTION, SOLUTION INTRAVENOUS; SUBCUTANEOUS at 21:27

## 2019-04-04 RX ADMIN — INSULIN ASPART SCH: 100 INJECTION, SOLUTION INTRAVENOUS; SUBCUTANEOUS at 16:40

## 2019-04-04 RX ADMIN — INSULIN ASPART SCH: 100 INJECTION, SOLUTION INTRAVENOUS; SUBCUTANEOUS at 12:12

## 2019-04-04 RX ADMIN — INSULIN ASPART SCH: 100 INJECTION, SOLUTION INTRAVENOUS; SUBCUTANEOUS at 09:13

## 2019-04-04 NOTE — CP.PCM.PN
Subjective





- Date & Time of Evaluation


Date of Evaluation: 04/04/19


Time of Evaluation: 07:50





- Subjective


Subjective: 





Hospitalist Progress Note





Patient was seen and examined at 7:50 AM 4/4/19 ICU Bed #6





Interview performed with the assistance of [employer] Interpretor ID # [id number]





54 year old  male (PMHX of recently diagnosed HTN) presented to [facility] ER with a chief complaint of left sided chest pain described

as a pressure sensation that had been going on and off for 1 month. It is worse 

with exertion and without radiation. It is associated with SOB. EKG in [place] 

ER showed ST changes, therefore Code Heart was instituted and patient 

transferred to [facility] Cardiac Cath Lab. Interventional Cardiologist Dr. Mas performed Cardiac Cath which revealed % occlusion of the LAD and 

this was stented with Bare Metal stent. Cardiac Catheterization also revealed 

disease in the RCA and intervention is potentially planned for Friday 4/5/19 at 

another institution.





Currently upon FULL ROS:


NO chest pain/pressure


NO palpitations


NO SOB/Cough/Wheezing


NO dyshpagia/odynophagia


NO abdominal pain


NO N/V/D/C


NO burning/pain with urination


NO new changes in vision


NO new changes in hearing


NO paresthesias


Mild Headache generalized that comes and goes


NO lightheadedness/dizziness





Exam:


General: AAOX3, NAD


HEENT: NCA, EOMI, PERRLA, NO cervical/supraclavicular/submandibular 

lymphadenopathy, NO thyromegaly, Nasal Turbinates are 

moist/nonerythemtous/nonedematous


Cardio: NS1 and NS2, NO M/R/G


Resp: CTA B/L, NO R/R/W


GI: Central Obesity, Soft, NT, Liver and spleen could not be palpated, NO 

guarding/rebound tenderness


Ext: Pulses are strong and equal, Capillary Refill is 2 seconds, NO edema, Some 

mile soreness to palpation around cardiac catheterization instertion site in the

right groin however no bruising/hardness palpated


Neuro: CN II through XII are grossly intact





Assessment and Plan:





1). STEMI S/P Bare Metal Stenting of the LAD 


Status: Acute


ASA 81 mg PO 1x/day


Eptifibatide 2 mcg/kg/min given through 11:30 PM 4/3/19


Lisinopril 2.5 mg PO 1x/day


Metoprolol Tartrate 12.5 mg PO Q12H


Crestor 20 mg PO HS


Brilinta 90 mg PO 2x/day


Nitroglycerin Drip


2D Echocardiogram: LFSVF is normal, EF is 60-65%, diastolic dysfunction, mild to

moderate tricuspid regurgitation, mild pulmonary HTN, mild pulmonic valvular 

regurgitation





2). Hx HTN


Status: Chronic


Lisinopril 2.5 mg PO 1x/day


Metoprolol Tartrate 12.5 mg PO Q12H





3). Elevated LFTs


Status: Acute


Could be secondary to shock liver


Declining





4). Leukocytosis


Status: Acute


Could be secondary to stress reponse


NO fevers


Declining





5). Prophylaxis


Pepcid 20 mg PO 2x/day


Bilateral SCDs





Disposition:


Plan for intervention of the RCA at another institution (either [facility] [place] [facility]) tentatively on Friday 4/5/19


NPO after midnight


Explained plan to patient via [employer] Interpretor ID # [id number]





Elbert Arevalo D.O.





Objective





- Vital Signs/Intake and Output


Vital Signs (last 24 hours): 


                                        











Temp Pulse Resp BP Pulse Ox


 


 98.7 F   70   15   106/45 L  97 


 


 04/04/19 04:00  04/04/19 07:00  04/04/19 07:00  04/04/19 06:48  04/04/19 07:00








Intake and Output: 


                                        











 04/04/19 04/04/19





 06:59 18:59


 


Intake Total 558.0 


 


Output Total 1400 


 


Balance -842.0 














- Medications


Medications: 


                               Current Medications





Acetaminophen (Tylenol 325mg Tab)  650 mg PO Q6 PRN


   PRN Reason: Fever >100.4 F


Aspirin (Aspirin Chewable)  81 mg PO DAILY [facility]


   Last Admin: 04/03/19 09:14 Dose:  81 mg


Dextrose (Dextrose 50% Inj)  0 ml IV STAT PRN; Protocol


   PRN Reason: Hypoglycemia Protocol


Dextrose (Glutose 15)  0 gm PO ONCE PRN; Protocol


   PRN Reason: Hypoglycemia Protocol


Famotidine (Pepcid)  20 mg PO BID [facility]


   Last Admin: 04/03/19 17:20 Dose:  20 mg


Glucagon (Glucagen Diagnostic Kit)  0 mg IM STAT PRN; Protocol


   PRN Reason: Hypoglycemia Protocol


Insulin Asp[facility] (Novolog)  [facility]; Protocol


   Last Admin: 04/03/19 21:30 Dose:  Not Given


Lisinopril (Zestril)  2.5 mg PO DAILY [facility]


   Last Admin: 04/03/19 09:14 Dose:  2.5 mg


Metoprolol Tartrate (Lopressor)  12.5 mg PO Q12 [facility]


   Last Admin: 04/03/19 21:28 Dose:  12.5 mg


Rosuvastatin Calcium (Crestor)  20 mg PO HS [facility]


   Last Admin: 04/03/19 21:28 Dose:  20 mg


Ticagrelor (Brilinta)  90 mg PO BID [facility]


   Last Admin: 04/03/19 17:20 Dose:  90 mg











- Labs


Labs: 


                                        





                                 04/04/19 06:22 





                                 04/04/19 06:22

## 2019-04-04 NOTE — CATH
--------------- APPROVED REPORT --------------





Date of service: 04/02/2019



Procedure(s) performed: Cardiac catheteization with prmary PCI of the 

LAD.



HISTORY

The patient is a 54 year-old male with a history of : hypertension, 

dyslipidemia.



INDICATION

The indication(s) include : STEMI (>0 to less than or equal to 6 

hours).



CASE TECHNIQUE

The patient was brought emergently to the Cardiac Catheterization 

Laboratory in a fasting state and was prepped and draped in a sterile 

manner. The right femoral groin was infiltrated with 2% Lidocaine 

subcutaneous anesthesia. The left coronary system was accessed and 

visualized with a 6 F XBLAD Guided catheter. The right coronary 

system was accessed and visualized with a 6 F JR Diagnostic catheter. 

The left ventricle was accessed and visualized with a 6 F Pig tail 

catheter. Left ventricular/Aortic Valve gradient assessed on 

pullback. Left ventriculogram was performed in CORTEZ projection. 

Closure device was deployed with a 6 Fr Angioseal without any 

complications.



Vessel Analysis

The patient's coronary anatomy is left dominant.



The left main coronary artery is a medium size vessel without 

significant stenosis. The left main bifurcates to the left anterior 

descending and circumflex.



The left anterior descending artery is a medium size vessel . There 

is a 100% stenosis in the ostial segment. The first diagonal branch 

is a medium size vessel with 90 in the proximal stenosis. 



The circumflex artery is a medium size vessel without significant 

stenosis. 



The right coronary artery is a large size vessel . There is a 80% 

stenosis in the mid segment. 



Left Ventricle

The left ventricle is normal in size with abnormal contractility. The 

left ventricular ejection fraction is estimated to be 45%. The left 

ventricular end diastolic pressure is 22 mmHg. 



PCI Technique Lesion

Anticoagulation was achieved with Heparin. Percutaneous coronary 

intervention was performed on the proximal left anterior descending 

artery segment. The lesion stenosis prior to intervention was 100% 

with MANISH 0 flow. A 6 F XB LAD Guide Catheter was used to engage the 

LM ostium. A Whisper Interventional Guidewire was used to cross the 

lesion.



BALLOON DILATION

A Balloon catheter 2.0X12 was inserted and inflated up to 10atm for 

2080%seconds. Repeat angiography revealed the following 

post-dilatation results: 80% long stenosis..



STENT DEPLOYMENT

A drug-eluting stent 3.0X12 was inserted and inflated up to 12atm for 

30seconds. Repeat angiography revealed the following post-stent 

deployment results: 0%residual stenosis..



Final angiography reveals 0 % stenosis with MANISH 3 flow.



Conclusion

Duoble vessel disesase.

Successful PTCA/Stent of the proximal and Ostial LAD.

Mild LV systolic dyfunction.



Recommendations

Daily ASA with Plavix for at least one year

Staged PCI of the RCA.

## 2019-04-05 LAB
ALBUMIN SERPL-MCNC: 4.1 G/DL (ref 3.5–5)
ALBUMIN/GLOB SERPL: 1.4 {RATIO} (ref 1–2.1)
ALT SERPL-CCNC: 43 U/L (ref 21–72)
APTT BLD: 29 SECONDS (ref 21–34)
AST SERPL-CCNC: 103 U/L (ref 17–59)
BASOPHILS # BLD AUTO: 0.1 K/UL (ref 0–0.2)
BASOPHILS NFR BLD: 0.6 % (ref 0–2)
BUN SERPL-MCNC: 20 MG/DL (ref 9–20)
CALCIUM SERPL-MCNC: 9.2 MG/DL (ref 8.6–10.4)
EOSINOPHIL # BLD AUTO: 1.1 K/UL (ref 0–0.7)
EOSINOPHIL NFR BLD: 9.3 % (ref 0–4)
ERYTHROCYTE [DISTWIDTH] IN BLOOD BY AUTOMATED COUNT: 13.5 % (ref 11.5–14.5)
GFR NON-AFRICAN AMERICAN: > 60
HGB BLD-MCNC: 14.1 G/DL (ref 12–18)
INR PPP: 1.2
LYMPHOCYTES # BLD AUTO: 3.8 K/UL (ref 1–4.3)
LYMPHOCYTES NFR BLD AUTO: 31.6 % (ref 20–40)
MCH RBC QN AUTO: 31.6 PG (ref 27–31)
MCHC RBC AUTO-ENTMCNC: 34 G/DL (ref 33–37)
MCV RBC AUTO: 92.8 FL (ref 80–94)
MONOCYTES # BLD: 1.2 K/UL (ref 0–0.8)
MONOCYTES NFR BLD: 10.3 % (ref 0–10)
NEUTROPHILS # BLD: 5.8 K/UL (ref 1.8–7)
NEUTROPHILS NFR BLD AUTO: 48.2 % (ref 50–75)
NRBC BLD AUTO-RTO: 0.1 % (ref 0–2)
PLATELET # BLD: 239 K/UL (ref 130–400)
PMV BLD AUTO: 8.6 FL (ref 7.2–11.7)
PROTHROMBIN TIME: 13.4 SECONDS (ref 9.7–12.2)
RBC # BLD AUTO: 4.47 MIL/UL (ref 4.4–5.9)
WBC # BLD AUTO: 12 K/UL (ref 4.8–10.8)

## 2019-04-05 RX ADMIN — INSULIN ASPART SCH: 100 INJECTION, SOLUTION INTRAVENOUS; SUBCUTANEOUS at 21:49

## 2019-04-05 RX ADMIN — INSULIN ASPART SCH: 100 INJECTION, SOLUTION INTRAVENOUS; SUBCUTANEOUS at 08:27

## 2019-04-05 NOTE — CP.PCM.PN
<Magaly Hoyt - Last Filed: 04/05/19 07:14>





Subjective





- Date & Time of Evaluation


Date of Evaluation: 04/05/19


Time of Evaluation: 07:15





- Subjective


Subjective: 





PGY-1 Magaly Hoyt D.O. medicine progress note for Dr. Elbert Arevalo's 

service:





Patient was seen and examined this morning.





Objective





- Vital Signs/Intake and Output


Vital Signs (last 24 hours): 


                                        











Temp Pulse Resp BP Pulse Ox


 


 98.1 F   75   26 H  99/66 L  98 


 


 04/05/19 04:00  04/05/19 06:00  04/05/19 06:00  04/05/19 04:37  04/05/19 06:00








Intake and Output: 


                                        











 04/05/19 04/05/19





 06:59 18:59


 


Intake Total 300 


 


Output Total 700 


 


Balance -400 














- Medications


Medications: 


                               Current Medications





Acetaminophen (Tylenol 325mg Tab)  650 mg PO Q6 PRN


   PRN Reason: Fever >100.4 F


Aspirin (Aspirin Chewable)  81 mg PO DAILY [facility]


   Last Admin: 04/04/19 09:12 Dose:  81 mg


Dextrose (Dextrose 50% Inj)  0 ml IV STAT PRN; Protocol


   PRN Reason: Hypoglycemia Protocol


Dextrose (Glutose 15)  0 gm PO ONCE PRN; Protocol


   PRN Reason: Hypoglycemia Protocol


Famotidine (Pepcid)  20 mg PO BID [facility]


   Last Admin: 04/04/19 17:04 Dose:  20 mg


Glucagon (Glucagen Diagnostic Kit)  0 mg IM STAT PRN; Protocol


   PRN Reason: Hypoglycemia Protocol


Heparin Sodium (Porcine) (Heparin)  5,000 units SC Q12H [facility]


   Last Admin: 04/04/19 21:10 Dose:  5,000 units


Insulin Aspart (Novolog)  0 unit SC [facility]; Protocol


   Last Admin: 04/04/19 21:27 Dose:  Not Given


Lisinopril (Zestril)  2.5 mg PO DAILY [facility]


   Last Admin: 04/04/19 12:15 Dose:  2.5 mg


Metoprolol Tartrate (Lopressor)  12.5 mg PO Q12 [facility]


   Last Admin: 04/04/19 21:20 Dose:  Not Given


Rosuvastatin Calcium (Crestor)  20 mg PO HS [facility]


   Last Admin: 04/04/19 21:10 Dose:  20 mg


Ticagrelor (Brilinta)  90 mg PO BID [facility]


   Last Admin: 04/04/19 17:04 Dose:  90 mg











- Labs


Labs: 


                                        





                                 04/05/19 05:52 





                                 04/05/19 05:52 





                                        











PT  13.4 SECONDS (9.7-12.2)  H  04/05/19  05:52    


 


INR  1.2   04/05/19  05:52    


 


APTT  29 SECONDS (21-34)   04/05/19  05:52    














<Elbert Arevalo - Last Filed: 04/05/19 08:09>





Objective





- Vital Signs/Intake and Output


Vital Signs (last 24 hours): 


                                        











Temp Pulse Resp BP Pulse Ox


 


 98.1 F   69   16   104/77   96 


 


 04/05/19 04:00  04/05/19 07:00  04/05/19 07:00  04/05/19 06:37  04/05/19 07:00








Intake and Output: 


                                        











 04/05/19 04/05/19





 06:59 18:59


 


Intake Total 300 


 


Output Total 700 


 


Balance -400 














- Medications


Medications: 


                               Current Medications





Acetaminophen (Tylenol 325mg Tab)  650 mg PO Q6 PRN


   PRN Reason: Fever >100.4 F


Aspirin (Aspirin Chewable)  81 mg PO DAILY [facility]


   Last Admin: 04/04/19 09:12 Dose:  81 mg


Dextrose (Dextrose 50% Inj)  0 ml IV STAT PRN; Protocol


   PRN Reason: Hypoglycemia Protocol


Dextrose (Glutose 15)  0 gm PO ONCE PRN; Protocol


   PRN Reason: Hypoglycemia Protocol


Famotidine (Pepcid)  20 mg PO BID [facility]


   Last Admin: 04/04/19 17:04 Dose:  20 mg


Glucagon (Glucagen Diagnostic Kit)  0 mg IM STAT PRN; Protocol


   PRN Reason: Hypoglycemia Protocol


Heparin Sodium (Porcine) (Heparin)  5,000 units SC Q12H [facility]


   Last Admin: 04/04/19 21:10 Dose:  5,000 units


Insulin Aspart (Novolog)  0 unit SC [facility]; Protocol


   Last Admin: 04/04/19 21:27 Dose:  Not Given


Lisinopril (Zestril)  2.5 mg PO DAILY [facility]


   Last Admin: 04/04/19 12:15 Dose:  2.5 mg


Metoprolol Tartrate (Lopressor)  12.5 mg PO Q12 [facility]


   Last Admin: 04/04/19 21:20 Dose:  Not Given


Rosuvastatin Calcium (Crestor)  20 mg PO HS [facility]


   Last Admin: 04/04/19 21:10 Dose:  20 mg


Ticagrelor (Brilinta)  90 mg PO BID [facility]


   Last Admin: 04/04/19 17:04 Dose:  90 mg











- Labs


Labs: 


                                        





                                 04/05/19 05:52 





                                 04/05/19 05:52 





                                        











PT  13.4 SECONDS (9.7-12.2)  H  04/05/19  05:52    


 


INR  1.2   04/05/19  05:52    


 


APTT  29 SECONDS (21-34)   04/05/19  05:52    














Attending/Attestation





- Attestation


I have personally seen and examined this patient.: Yes


I have fully participated in the care of the patient.: Yes


I have reviewed all pertinent clinical information, including history, physical 

exam and plan: Yes


Notes (Text): 





04/05/19 08:06


Hospitalist Progress Note





Patient was seen and examined at 7:50 AM 4/5/19 ICU Bed #6





54 year old  male (PMHX of recently diagnosed HTN) presented to [facility] ER with a chief complaint of left sided chest pain described

as a pressure sensation that had been going on and off for 1 month. It is worse 

with exertion and without radiation. It is associated with SOB. EKG in [place] 

ER showed ST changes, therefore Code Heart was instituted and patient 

transferred to [facility] Cardiac Cath Lab. Interventional Cardiologist Dr. Mas performed Cardiac Cath which revealed % occlusion of the LAD and 

this was stented with Bare Metal stent. Cardiac Catheterization also revealed 

disease in the RCA and intervention is planned for afternoon Friday 4/5/19 at 

another institution (either [facility] or [facility] depending upon where 

Dr. Mas can arrange for this to be done)





Currently upon FULL ROS:


NO chest pain/pressure


NO palpitations


NO SOB/Cough/Wheezing


NO dyshpagia/odynophagia


NO abdominal pain


NO N/V/D/C


NO burning/pain with urination


NO new changes in vision


NO new changes in hearing


NO paresthesias


Mild Headache generalized that comes and goes


NO lightheadedness/dizziness





Exam:


General: AAOX3, NAD


HEENT: NCA, EOMI, PERRLA, NO cervical/supraclavicular/submandibular 

lymphadenopathy, NO thyromegaly, Nasal Turbinates are 

moist/nonerythemtous/nonedematous


Cardio: NS1 and NS2, NO M/R/G


Resp: CTA B/L, NO R/R/W


GI: Central Obesity, Soft, NT, Liver and spleen could not be palpated, NO 

guarding/rebound tenderness


Ext: Pulses are strong and equal, Capillary Refill is 2 seconds, NO edema, Some 

mile soreness to palpation around cardiac catheterization instertion site in the

right groin however no bruising/hardness palpated


Neuro: CN II through XII are grossly intact





Assessment and Plan:





1). STEMI S/P Bare Metal Stenting of the LAD 


Status: Acute


ASA 81 mg PO 1x/day


Eptifibatide 2 mcg/kg/min given through 11:30 PM 4/3/19


Lisinopril 2.5 mg PO 1x/day


Metoprolol Tartrate 12.5 mg PO Q12H


Crestor 20 mg PO HS


Brilinta 90 mg PO 2x/day


Nitroglycerin Drip


2D Echocardiogram: LFSVF is normal, EF is 60-65%, diastolic dysfunction, mild to

moderate tricuspid regurgitation, mild pulmonary HTN, mild pulmonic valvular 

regurgitation





2). Hx HTN


Status: Chronic


Lisinopril 2.5 mg PO 1x/day


Metoprolol Tartrate 12.5 mg PO Q12H





3). Elevated LFTs


Status: Acute


Could be secondary to shock liver


Declining





4). Leukocytosis


Status: Acute


Could be secondary to stress reponse


NO fevers


Stable





5). Prophylaxis


Pepcid 20 mg PO 2x/day


Bilateral SCDs





Disposition:


Plan for intervention of the RCA at another institution (either [facility] or [facility] on afernoon Friday 4/5/19


NPO after breakfast 4/5/19


Explained plan to patient via [employer] Interpretor ID # [id number] on 4/4/19 and again

exlained by Dr. Mas to patient with interpretor on evening 4/4/19 as per my 

conversation with ICU Nurse Katarzyna who is care for patient on 4/5/19





Elbert Arevalo D.O.

## 2019-04-06 VITALS
DIASTOLIC BLOOD PRESSURE: 75 MMHG | RESPIRATION RATE: 16 BRPM | SYSTOLIC BLOOD PRESSURE: 114 MMHG | OXYGEN SATURATION: 98 % | HEART RATE: 81 BPM

## 2019-04-06 VITALS — TEMPERATURE: 98.1 F

## 2019-04-06 LAB
ALBUMIN SERPL-MCNC: 4.3 G/DL (ref 3.5–5)
ALBUMIN/GLOB SERPL: 1.5 {RATIO} (ref 1–2.1)
ALT SERPL-CCNC: 27 U/L (ref 21–72)
APTT BLD: 29 SECONDS (ref 21–34)
AST SERPL-CCNC: 60 U/L (ref 17–59)
BASOPHILS # BLD AUTO: 0.1 K/UL (ref 0–0.2)
BASOPHILS NFR BLD: 0.6 % (ref 0–2)
BUN SERPL-MCNC: 18 MG/DL (ref 9–20)
CALCIUM SERPL-MCNC: 9.1 MG/DL (ref 8.6–10.4)
EOSINOPHIL # BLD AUTO: 0.9 K/UL (ref 0–0.7)
EOSINOPHIL NFR BLD: 7.8 % (ref 0–4)
ERYTHROCYTE [DISTWIDTH] IN BLOOD BY AUTOMATED COUNT: 13.5 % (ref 11.5–14.5)
GFR NON-AFRICAN AMERICAN: > 60
HGB BLD-MCNC: 14.1 G/DL (ref 12–18)
INR PPP: 1.3
LYMPHOCYTES # BLD AUTO: 2.6 K/UL (ref 1–4.3)
LYMPHOCYTES NFR BLD AUTO: 23.4 % (ref 20–40)
MCH RBC QN AUTO: 31.8 PG (ref 27–31)
MCHC RBC AUTO-ENTMCNC: 34.2 G/DL (ref 33–37)
MCV RBC AUTO: 92.9 FL (ref 80–94)
MONOCYTES # BLD: 1 K/UL (ref 0–0.8)
MONOCYTES NFR BLD: 9.4 % (ref 0–10)
NEUTROPHILS # BLD: 6.4 K/UL (ref 1.8–7)
NEUTROPHILS NFR BLD AUTO: 58.8 % (ref 50–75)
NRBC BLD AUTO-RTO: 0.1 % (ref 0–2)
PLATELET # BLD: 247 K/UL (ref 130–400)
PMV BLD AUTO: 8.6 FL (ref 7.2–11.7)
PROTHROMBIN TIME: 14 SECONDS (ref 9.7–12.2)
RBC # BLD AUTO: 4.45 MIL/UL (ref 4.4–5.9)
WBC # BLD AUTO: 10.9 K/UL (ref 4.8–10.8)

## 2019-04-06 RX ADMIN — INSULIN ASPART SCH: 100 INJECTION, SOLUTION INTRAVENOUS; SUBCUTANEOUS at 08:40

## 2019-04-06 NOTE — CP.PCM.DIS
Provider





- Provider


Date of Admission: 


04/02/19 21:34





Attending physician: 


Elbert Arevalo MD





Consults: 








04/03/19 02:24


Inpatient NP Core Measures Referral Routine 


   Comment: 


   Physician Instructions: 


   Reason For Exam: admitted due to chest pain, dx. STEMI











Time Spent in preparation of Discharge (in minutes): 40





Hospital Course





- Lab Results


Lab Results: 


                                  Micro Results





04/02/19 05:47   Naris   MRSA Culture (Admit) - Final


                            MRSA NOT DETECTED





                             Most Recent Lab Values











WBC  10.9 K/uL (4.8-10.8)  H  04/06/19  06:14    


 


RBC  4.45 Mil/uL (4.40-5.90)   04/06/19  06:14    


 


Hgb  14.1 g/dL (12.0-18.0)   04/06/19  06:14    


 


Hct  41.3 % (35.0-51.0)   04/06/19  06:14    


 


MCV  92.9 fL (80.0-94.0)   04/06/19  06:14    


 


MCH  31.8 pg (27.0-31.0)  H  04/06/19  06:14    


 


MCHC  34.2 g/dL (33.0-37.0)   04/06/19  06:14    


 


RDW  13.5 % (11.5-14.5)   04/06/19  06:14    


 


Plt Count  247 K/uL (130-400)   04/06/19  06:14    


 


MPV  8.6 fL (7.2-11.7)   04/06/19  06:14    


 


Neut % (Auto)  58.8 % (50.0-75.0)   04/06/19  06:14    


 


Lymph % (Auto)  23.4 % (20.0-40.0)   04/06/19  06:14    


 


Mono % (Auto)  9.4 % (0.0-10.0)   04/06/19  06:14    


 


Eos % (Auto)  7.8 % (0.0-4.0)  H  04/06/19  06:14    


 


Baso % (Auto)  0.6 % (0.0-2.0)   04/06/19  06:14    


 


Neut # (Auto)  6.4 K/uL (1.8-7.0)   04/06/19  06:14    


 


Lymph # (Auto)  2.6 K/uL (1.0-4.3)   04/06/19  06:14    


 


Mono # (Auto)  1.0 K/uL (0.0-0.8)  H  04/06/19  06:14    


 


Eos # (Auto)  0.9 K/uL (0.0-0.7)  H  04/06/19  06:14    


 


Baso # (Auto)  0.1 K/uL (0.0-0.2)   04/06/19  06:14    


 


PT  14.0 SECONDS (9.7-12.2)  H  04/06/19  06:14    


 


INR  1.3   04/06/19  06:14    


 


APTT  29 SECONDS (21-34)   04/06/19  06:14    


 


Sodium  132 mmol/L (132-148)   04/06/19  06:14    


 


Potassium  4.3 mmol/L (3.6-5.2)   04/06/19  06:14    


 


Chloride  101 mmol/L ()   04/06/19  06:14    


 


Carbon Dioxide  22 mmol/L (22-30)   04/06/19  06:14    


 


Anion Gap  14  (10-20)   04/06/19  06:14    


 


BUN  18 mg/dL (9-20)   04/06/19  06:14    


 


Creatinine  0.8 mg/dL (0.8-1.5)   04/06/19  06:14    


 


Est GFR ( Amer)  > 60   04/06/19  06:14    


 


Est GFR (Non-Af Amer)  > 60   04/06/19  06:14    


 


POC Glucose (mg/dL)  178 mg/dL ()  H  04/06/19  11:36    


 


Random Glucose  100 mg/dL ()   04/06/19  06:14    


 


Hemoglobin A1c  6.6 % (4.2-6.5)  H  04/03/19  07:30    


 


Calcium  9.1 mg/dl (8.6-10.4)   04/06/19  06:14    


 


Phosphorus  4.0 mg/dL (2.5-4.5)   04/06/19  06:14    


 


Magnesium  2.3 mg/dL (1.6-2.3)   04/06/19  06:14    


 


Total Bilirubin  0.6 mg/dL (0.2-1.3)   04/06/19  06:14    


 


AST  60 U/L (17-59)  H D 04/06/19  06:14    


 


ALT  27 U/L (21-72)   04/06/19  06:14    


 


Alkaline Phosphatase  86 U/L ()   04/06/19  06:14    


 


Troponin I  139.0000 ng/mL (0.00-0.120)  H*  04/03/19  05:52    


 


Total Protein  7.3 g/dL (6.3-8.3)   04/06/19  06:14    


 


Albumin  4.3 g/dL (3.5-5.0)   04/06/19  06:14    


 


Globulin  2.9 gm/dL (2.2-3.9)   04/06/19  06:14    


 


Albumin/Globulin Ratio  1.5  (1.0-2.1)   04/06/19  06:14    


 


Triglycerides  143 mg/dL (0-149)   04/03/19  05:52    


 


Cholesterol  210 mg/dL (0-199)  H  04/03/19  05:52    


 


LDL Cholesterol Direct  141 mg/dL (0-129)  H  04/03/19  05:52    


 


HDL Cholesterol  42 mg/dL (30-70)   04/03/19  05:52    














- Hospital Course


Hospital Course: 





Hospitalist Discharge Summary





Patient was seen and examined at 11:15 AM 4/6/19 ICU Bed #6





54 year old  male (PMHX of recently diagnosed HTN) presented to [facility] ER with a chief complaint of left sided chest pain described

as a pressure sensation that had been going on and off for 1 month. It is worse 

with exertion and without radiation. It is associated with SOB. EKG in [place] 

ER showed ST changes, therefore Code Heart was instituted and patient 

transferred to [facility] Cardiac Cath Lab. Interventional Cardiologist Dr. Mas performed Cardiac Cath which revealed % occlusion of the LAD and 

this was stented with Bare Metal stent. Cardiac Catheterization also revealed 

disease in the RCA and intervention was performed by Dr. Mas at [facility] on 

afternoon of 4/5/19. Medical Resident Dr. Sampson spoke with Dr. Mas on morning

of 4/6/19 and he has cleared patient for discharge with instructions to follow 

up with him at his office on Wednesday 4/10/19 at 4 PM. All discharge 

instructions were thoroughly gone over with the patient using [employer] 

ID # [id number]. Please see the individual Assessment and Plans below for treatment

while he was admitted and please refer to the medical record for full details of

his hospital stay.





Currently upon FULL ROS:


NO chest pain/pressure


NO palpitations


NO SOB/Cough/Wheezing


NO dyshpagia/odynophagia


NO abdominal pain


NO N/V/D/C


NO burning/pain with urination


NO new changes in vision


NO new changes in hearing


NO paresthesias


Mild Headache generalized that comes and goes


NO lightheadedness/dizziness





Exam:


General: AAOX3, NAD


HEENT: NCA, EOMI, PERRLA, NO cervical/supraclavicular/submandibular 

lymphadenopathy, NO thyromegaly, Nasal Turbinates are 

moist/nonerythemtous/nonedematous


Cardio: NS1 and NS2, NO M/R/G


Resp: CTA B/L, NO R/R/W


GI: Central Obesity, Soft, NT, Liver and spleen could not be palpated, NO 

guarding/rebound tenderness


Ext: Pulses are strong and equal, Capillary Refill is 2 seconds, NO edema, Some 

mile soreness to palpation around cardiac catheterization instertion site in the

right groin however no bruising/hardness palpated


Neuro: CN II through XII are grossly intact





Assessment and Plan:





1). STEMI S/P Bare Metal Stenting of the LAD 


Status: Acute


ASA 81 mg PO 1x/day


Eptifibatide 2 mcg/kg/min given through 11:30 PM 4/3/19


Lisinopril 2.5 mg PO 1x/day


Metoprolol Tartrate 12.5 mg PO Q12H


Crestor 20 mg PO HS will be converted to Atorvastatin upon discharge


Brilinta 90 mg PO 2x/day but this will be converted to Plavix upon discharge as 

patient does not have insurance


Nitroglycerin Drip


2D Echocardiogram: LFSVF is normal, EF is 60-65%, diastolic dysfunction, mild to

moderate tricuspid regurgitation, mild pulmonary HTN, mild pulmonic valvular 

regurgitation





2). Hx HTN


Status: Chronic


Lisinopril 2.5 mg PO 1x/day


Metoprolol Tartrate 12.5 mg PO Q12H





3). Elevated LFTs


Status: Acute


Could be secondary to shock liver


Declining





4). Leukocytosis


Status: Acute


Could be secondary to stress reponse


NO fevers


Stable





5). Prophylaxis


Pepcid 20 mg PO 2x/day


Bilateral SCDs








The following instructions were provided to patient in [place].





1). Please make sure to follow up with Cardiologist Dr. Mas on Wednesday 

4/10/19 at 4 PM. His office is located at [address] in [place], [place]. His office number is [phone]. Please call his office to confirm 

your appointment on Wednesday 4/10/19.





2). You will also need a primary care physician to help coordinate your health 

care. Please call the [facility] at [phone]

to make an appointment to take place in the next 7 to 10 days. This clinic is 

located at [address] in [place], [place]. They will also 

provide future prescriptions that you will need to have filled at your pharmacy.





3). Please have the following medications filled at your pharmacy on your way 

home from the hospital and use as directed. You were only provided with 30 day 

prescriptions therefore please make sure that you follow up at the [facility] clinic:





Clopidogrel 75 mg, 1 tablet by mouth once a day at 8 AM


Atorvastatin 40 mg, 1 tablet by mouth once a day at 8 PM


Lisinopril 2.5 mg, 1 tablet by mouth once a day at 2 PM


Aspirin 81 mg, 1 tablet by mouth once a day at 8 AM


Metoprolol 12.5 mg, 1 tablet by mouth two time a day at 8 AM and 8 PM





4). You are NOT cleared to go back to work. A note has been provided to you upon

discharge which you should give a copy to your employer.





5). NO heavy lifting. NO strenuous exercise until you cleared to do so by 

Cardiologist Dr. Mas.





6). Please take care and be well.








Las siguientes instrucciones debern proporcionarse al paciente en espaol al 

momento del diya:





1). Por favor, asegrese de hacer un seguimiento con el cardilogo Dr. Mas el 

mircoles 4/10/19 a las 4 PM. [facility] oficina est ubicada en [address] en [place], [place]. Zaidi nmero de oficina es el [phone]. Por favor 

llame a zaidi oficina para confirmar zaidi camilo el mircoles 10/10/19.





2). Viktoria necesitar un mdico de atencin primaria para ayudar a coordinar zaidi

atencin mdica. Llame al [facility] al 

[phone] para hacer neal camilo que tendr lugar en los prximos 7 a 10 mahan. 

Esta clnica est ubicada en [facility] [address] en [place], [place]. Viktoria proporcionarn futuras recetas que deber completar en zaidi farmacia.





3). Por favor, llene los siguientes medicamentos en zaidi farmacia cuando regrese a

casa desde el hospital y selos segn las indicaciones. Solo recibi recetas por

30 mahan, por lo tanto, asegrese de hacer un seguimiento en la clnica de [facility]:





Clopidogrel 75 mg, 1 tableta por va oral neal vez al da a las 8 AM


Atorvastatina 40 mg, 1 tableta por va oral neal vez al da a las 8 p. M.


Lisinopril 2.5 mg, 1 tableta por va oral neal vez al da a las 2 PM


Aspirina 81 mg, 1 tableta por va oral neal vez al da a las 8 AM


Metoprolol 12.5 mg, 1 tableta por va oral dos veces al da a las 8 AM y 8 PM





4). Usted NO est autorizado para volver al trabajo. Se le entreg neal nota al 

momento del diya, la cual debe entregar neal copia a zaidi empleador.





5). NO levantar objetos pesados. NINGN ejercicio extenuante hasta que el 

cardilogo Dr. Mas lo autorice a hacerlo.





6). Por favor cuidate y estar kamron.








Elbert Arevalo D.O.





Discharge Exam





- Head Exam


Head Exam: ATRAUMATIC, NORMOCEPHALIC





Discharge Plan





- Discharge Medications


Prescriptions: 


Aspirin [Aspirin Chewable] 81 mg PO DAILY #30 chew


Lisinopril [Zestril] 2.5 mg PO DAILY #30 tab


Metoprolol Tartrate [Lopressor] 12.5 mg PO Q12 #60 tab





- Follow Up Plan


Condition: GOOD


Disposition: HOME/ ROUTINE


Instructions:  Heart Attack (DC), Chest Pain (DC), Aspirin, Lisinopril, 

Metoprolol, Hypertension (DC)


Additional Instructions: 


The following instructions will need to be provided to patient in [place] upon 

discharge:





1). Please make sure to follow up with Cardiologist Dr. Mas on Wednesday 

4/10/19 at 4 PM. His office is located at [address] [place] [address] [address] in [place], [place]. His office number is [phone]. Please call his office to confirm 

your appointment on Wednesday 4/10/19.





2). You will also need a primary care physician to help coordinate your health 

care. Please call the [facility] at [phone]

 to make an appointment to take place in the next 7 to 10 days. This clinic is 

located at [address] in [place], [place]. They will also 

provide future prescriptions that you will need to have filled at your pharmacy.





3). Please have the following medications filled at your pharmacy on your way 

home from the hospital and use as directed. You were only provided with 30 day 

prescriptions therefore please make sure that you follow up at the [facility] clinic:





Clopidogrel 75 mg, 1 tablet by mouth once a day at 8 AM


Atorvastatin 40 mg, 1 tablet by mouth once a day at 8 PM


Lisinopril 2.5 mg, 1 tablet by mouth once a day at 2 PM


Aspirin 81 mg, 1 tablet by mouth once a day at 8 AM


Metoprolol 12.5 mg, 1 tablet by mouth two time a day at 8 AM and 8 PM





4). You are NOT cleared to go back to work. A note has been provided to you upon

 discharge which you should give a copy to your employer.





5). NO heavy lifting. NO strenuous exercise until you cleared to do so by 

Cardiologist Dr. Mas.





6). Please take care and be well.











Las siguientes instrucciones debern proporcionarse al paciente en espaol al 

momento del diya:





1). Por favor, asegrese de hacer un seguimiento con el cardilogo Dr. Mas el 

mircoles 4/10/19 a las 4 PM. Zaidi oficina est ubicada en [address] en [place], [place]. Zaidi nmero de oficina es el [phone]. Por favor 

llame a zaidi oficina para confirmar zaidi camilo el mircoles 10/10/19.





2). [place]in necesitar un mdico de atencin primaria para ayudar a coordinar zaidi

 atencin mdica. Llame al [facility] al 

[phone] para hacer neal camilo que tendr lugar en los prximos 7 a 10 mahan. 

Esta clnica est ubicada en [facility] [address] en [place], [place]. Viktoria proporcionarn futuras recetas que deber completar en zadii farmacia.





3). Por favor, llene los siguientes medicamentos en zaidi farmacia cuando regrese a

 casa desde el hospital y selos segn las indicaciones. Solo recibi recetas 

por 30 mahan, por lo tanto, asegrese de hacer un seguimiento en la clnica de 

[facility]:





Clopidogrel 75 mg, 1 tableta por va oral neal vez al da a las 8 AM


Atorvastatina 40 mg, 1 tableta por va oral neal vez al da a las 8 p. M.


Lisinopril 2.5 mg, 1 tableta por va oral neal vez al da a las 2 PM


Aspirina 81 mg, 1 tableta por va oral neal vez al da a las 8 AM


Metoprolol 12.5 mg, 1 tableta por va oral dos veces al da a las 8 AM y 8 PM





4). Usted NO est autorizado para volver al trabajo. Se le entreg neal nota al 

momento del diya, la cual debe entregar neal copia a zaidi empleador.





5). NO levantar objetos pesados. NINGN ejercicio extenuante hasta que el 

cardilogo Dr. Mas lo autorice a hacerlo.





6). Por favor cuidate y estar kamron.








Elbert Arevalo D.O.


Referrals: 


[facility] [Outside]


Rosalia Mas MD [Staff Provider] -

## 2019-04-06 NOTE — CP.PCM.PN
Subjective





- Date & Time of Evaluation


Date of Evaluation: 04/06/19


Time of Evaluation: 08:54





- Subjective


Subjective: 





DC Today, Pt. to follow up with Dr. Mas in his office on Wednesday at 4





Objective





- Vital Signs/Intake and Output


Vital Signs (last 24 hours): 


                                        











Temp Pulse Resp BP Pulse Ox


 


 98.6 F   76   19   103/65   98 


 


 04/06/19 04:00  04/06/19 05:48  04/06/19 05:48  04/06/19 05:48  04/06/19 05:48








Intake and Output: 


                                        











 04/06/19 04/06/19





 06:59 18:59


 


Intake Total 0 


 


Output Total 0 


 


Balance 0 














- Medications


Medications: 


                               Current Medications





Acetaminophen (Tylenol 325mg Tab)  650 mg PO Q6 PRN


   PRN Reason: Fever >100.4 F


Aspirin (Aspirin Chewable)  81 mg PO DAILY [facility]


   Last Admin: 04/05/19 10:51 Dose:  81 mg


Dextrose (Dextrose 50% Inj)  0 ml IV STAT PRN; Protocol


   PRN Reason: Hypoglycemia Protocol


Dextrose (Glutose 15)  0 gm PO ONCE PRN; Protocol


   PRN Reason: Hypoglycemia Protocol


Famotidine (Pepcid)  20 mg PO BID [facility]


   Last Admin: 04/05/19 20:13 Dose:  20 mg


Glucagon (Glucagen Diagnostic Kit)  0 mg IM STAT PRN; Protocol


   PRN Reason: Hypoglycemia Protocol


Heparin Sodium (Porcine) (Heparin)  5,000 units SC Q12H [facility]


   Last Admin: 04/05/19 21:35 Dose:  5,000 units


Insulin Aspart (Novolog)  0 unit SC ACHS [facility]; Protocol


   Last Admin: 04/06/19 08:40 Dose:  Not Given


Lisinopril (Zestril)  2.5 mg PO DAILY [facility]


   Last Admin: 04/05/19 10:51 Dose:  Not Given


Metoprolol Tartrate (Lopressor)  12.5 mg PO Q12 [facility]


   Last Admin: 04/05/19 21:35 Dose:  12.5 mg


Rosuvastatin Calcium (Crestor)  20 mg PO HS [facility]


   Last Admin: 04/05/19 21:35 Dose:  20 mg


Ticagrelor (Brilinta)  90 mg PO BID [facility]


   Last Admin: 04/05/19 20:13 Dose:  90 mg











- Labs


Labs: 


                                        





                                 04/06/19 06:14 





                                 04/06/19 06:14 





                                        











PT  14.0 SECONDS (9.7-12.2)  H  04/06/19  06:14    


 


INR  1.3   04/06/19  06:14    


 


APTT  29 SECONDS (21-34)   04/06/19  06:14

## 2019-04-07 NOTE — CARD
--------------- APPROVED REPORT --------------





Date of service: 04/03/2019



EKG Measurement

Heart [id number]

AK [id number]

[id number]

[id number]

[id number]



<Conclusion>

Normal sinus rhythm

Anteroseptal infarct, possibly acute

** ** ACUTE MI / STEMI ** **

Abnormal ECG

## 2019-04-09 ENCOUNTER — HOSPITAL ENCOUNTER (INPATIENT)
Dept: HOSPITAL 31 - C.ER | Age: 54
LOS: 1 days | Discharge: ANOTHER HOSPITAL | DRG: 174 | End: 2019-04-10
Attending: INTERNAL MEDICINE | Admitting: INTERNAL MEDICINE
Payer: COMMERCIAL

## 2019-04-09 VITALS — BODY MASS INDEX: 28.1 KG/M2

## 2019-04-09 DIAGNOSIS — I25.2: ICD-10-CM

## 2019-04-09 DIAGNOSIS — I25.10: ICD-10-CM

## 2019-04-09 DIAGNOSIS — I11.9: ICD-10-CM

## 2019-04-09 DIAGNOSIS — I21.09: ICD-10-CM

## 2019-04-09 DIAGNOSIS — E78.5: ICD-10-CM

## 2019-04-09 DIAGNOSIS — I22.0: Primary | ICD-10-CM

## 2019-04-09 LAB
ALBUMIN SERPL-MCNC: 4.6 G/DL (ref 3.5–5)
ALBUMIN/GLOB SERPL: 1.3 {RATIO} (ref 1–2.1)
ALT SERPL-CCNC: 24 U/L (ref 21–72)
APTT BLD: 31 SECONDS (ref 21–34)
AST SERPL-CCNC: 36 U/L (ref 17–59)
BASOPHILS # BLD AUTO: 0.1 K/UL (ref 0–0.2)
BASOPHILS NFR BLD: 0.8 % (ref 0–2)
BUN SERPL-MCNC: 21 MG/DL (ref 9–20)
CALCIUM SERPL-MCNC: 9.9 MG/DL (ref 8.6–10.4)
CK MB SERPL-MCNC: 54 NG/ML (ref 0–3.38)
EOSINOPHIL # BLD AUTO: 1.1 K/UL (ref 0–0.7)
EOSINOPHIL NFR BLD: 10.8 % (ref 0–4)
ERYTHROCYTE [DISTWIDTH] IN BLOOD BY AUTOMATED COUNT: 13.7 % (ref 11.5–14.5)
GFR NON-AFRICAN AMERICAN: > 60
HGB BLD-MCNC: 12.8 G/DL (ref 12–18)
INR PPP: 1.2
LYMPHOCYTES # BLD AUTO: 3.8 K/UL (ref 1–4.3)
LYMPHOCYTES NFR BLD AUTO: 36.6 % (ref 20–40)
MCH RBC QN AUTO: 31.2 PG (ref 27–31)
MCHC RBC AUTO-ENTMCNC: 33.7 G/DL (ref 33–37)
MCV RBC AUTO: 92.6 FL (ref 80–94)
MONOCYTES # BLD: 0.9 K/UL (ref 0–0.8)
MONOCYTES NFR BLD: 8.4 % (ref 0–10)
NEUTROPHILS # BLD: 4.6 K/UL (ref 1.8–7)
NEUTROPHILS NFR BLD AUTO: 43.4 % (ref 50–75)
NRBC BLD AUTO-RTO: 0.1 % (ref 0–2)
PLATELET # BLD: 213 K/UL (ref 130–400)
PMV BLD AUTO: 8.6 FL (ref 7.2–11.7)
PROTHROMBIN TIME: 13 SECONDS (ref 9.7–12.2)
RBC # BLD AUTO: 4.09 MIL/UL (ref 4.4–5.9)
TROPONIN I SERPL-MCNC: 49.2 NG/ML (ref 0–0.12)
WBC # BLD AUTO: 10.5 K/UL (ref 4.8–10.8)

## 2019-04-09 PROCEDURE — 4A023N7 MEASUREMENT OF CARDIAC SAMPLING AND PRESSURE, LEFT HEART, PERCUTANEOUS APPROACH: ICD-10-PCS | Performed by: INTERNAL MEDICINE

## 2019-04-09 PROCEDURE — 027034Z DILATION OF CORONARY ARTERY, ONE ARTERY WITH DRUG-ELUTING INTRALUMINAL DEVICE, PERCUTANEOUS APPROACH: ICD-10-PCS | Performed by: INTERNAL MEDICINE

## 2019-04-09 PROCEDURE — B2111ZZ FLUOROSCOPY OF MULTIPLE CORONARY ARTERIES USING LOW OSMOLAR CONTRAST: ICD-10-PCS | Performed by: INTERNAL MEDICINE

## 2019-04-09 PROCEDURE — B2131ZZ FLUOROSCOPY OF MULTIPLE CORONARY ARTERY BYPASS GRAFTS USING LOW OSMOLAR CONTRAST: ICD-10-PCS | Performed by: INTERNAL MEDICINE

## 2019-04-09 RX ADMIN — EPTIFIBATIDE SCH MLS/HR: 0.75 INJECTION, SOLUTION INTRAVENOUS at 18:45

## 2019-04-09 RX ADMIN — EPTIFIBATIDE SCH: 0.75 INJECTION, SOLUTION INTRAVENOUS at 23:00

## 2019-04-09 RX ADMIN — EPTIFIBATIDE SCH MLS/HR: 0.75 INJECTION, SOLUTION INTRAVENOUS at 13:00

## 2019-04-09 NOTE — RAD
Date of service: 



04/09/2019



PROCEDURE:  CHEST RADIOGRAPH, 1 VIEW



HISTORY:

chest pain



COMPARISON:

4/3/2019



FINDINGS:



LUNGS:

Current inspiration less now than before.



No consolidation.



PLEURA:

No pneumothorax or pleural fluid seen.



CARDIOVASCULAR:

 There is presence of aortic atherosclerotic calcification on x-ray. 

Borderline cardiomegaly-similar.  Pulmonary vasculature appears 

top-normal.



OSSEOUS STRUCTURES:

Right acromioclavicular joint space widening-similar in appearance



VISUALIZED UPPER ABDOMEN:

Normal.



OTHER FINDINGS:

None. 



IMPRESSION:

Probable top-normal pulmonary vasculature.  And borderline 

[id number] 



Other findings as above.

## 2019-04-09 NOTE — C.PDOC
Time Seen by Provider: 04/09/19 10:45


Chief Complaint (Nursing): Chest Pain





Past Medical History


Vital Signs: 





                                Last Vital Signs











Temp  98.8 F   04/09/19 10:40


 


Pulse  73   04/09/19 10:40


 


Resp  17   04/09/19 10:40


 


BP      


 


Pulse Ox  99   04/09/19 10:40














- Medical History


PMH: HTN


   Denies: Chronic Kidney Disease





- [facility] Procedures











DILATE 1 COR ART, BIFURC, W INTRALUM DEV, PERC (04/02/19)


MEASURE OF CARDIAC SAMPL & PRESSURE, L HEART, PERC APPROACH (04/02/19)











- Social History


Hx Alcohol Use:  (occ./special occ.)


Hx Substance Use: No





ED Course And Treatment


O2 Sat by Pulse Oximetry: 99





Disposition





- Disposition


Forms:  [facility] (English)

## 2019-04-09 NOTE — CP.PCM.PN
<Elie Dillard - Last Filed: 04/09/19 17:46>





Subjective





- Date & Time of Evaluation


Date of Evaluation: 04/09/19


Time of Evaluation: 08:00





- Subjective


Subjective: 





Code heart note: 





54 M w/ PMHx of HTN, CAD w/ recent bare metal stent placement (LAD w/ 99% 

stentosis) 1 week prior presented to ED for chest pain.


Pain began following eating breakfast; described as pressure like pain, w/ 

radiation to L arm, denies N/V, strenous activity. 


Patient was discharged with plavix and aspirin and has been compliant with 

medication. 





During EMS transport, patient was found to have EKG changes w/ ST elevations in 

anterior leads of V1 to V4. 


Aspirin 325, 4 Nitroglycerin, and 50 mcg fentyl given. Patient continued to have

pain. 





In ED patient had persistent chest pain with continued anterior lead changes in 

EKG.


180 mg of brilenta, heprin drip w/ 5000 units bolus given.





Code heart called and taken for cardiac cath procedure with cardiologist Dr. Johns. 


 





Objective





- Vital Signs/Intake and Output


Vital Signs (last 24 hours): 


                                        











Temp Pulse Resp BP Pulse Ox


 


 98 F   62   14   137/81   99 


 


 04/09/19 16:00  04/09/19 11:13  04/09/19 11:13  04/09/19 11:13  04/09/19 11:29











- Medications


Medications: 


                               Current Medications





Aspirin (Ecotrin)  81 mg PO DAILY [facility]


Eptifibatide (Integrilin)  75 mg in 100 mls @ 10.959 mls/hr IV .Q9H8M [facility]


   Stop: 04/10/19 14:01


Metoprolol Tartrate (Lopressor)  25 mg PO BID [facility]


Nitroglycerin (Nitrostat Sl Tab)  0.4 mg SL Q5M PRN


   PRN Reason: Other


   Last Admin: 04/09/19 10:48 Dose:  0.4 mg


Pantoprazole Sodium (Protonix Ec Tab)  40 mg PO DAILY [facility]


Rosuvastatin Calcium (Crestor)  40 mg PO HS [facility]


Ticagrelor (Brilinta)  90 mg PO BID [facility]











- Labs


Labs: 


                                        





                                 04/09/19 10:48 





                                 04/09/19 10:48 





                                        











PT  13.0 SECONDS (9.7-12.2)  H  04/09/19  10:48    


 


INR  1.2   04/09/19  10:48    


 


APTT  31 SECONDS (21-34)   04/09/19  10:48    














<Beth De La Cruz - Last Filed: 04/13/19 18:20>





Objective





- Vital Signs/Intake and Output


Vital Signs (last 24 hours): 


                                        











Temp Pulse Resp BP Pulse Ox


 


 99.1 F   74   13   99/60 L  94 L


 


 04/10/19 12:00  04/10/19 12:00  04/10/19 12:00  04/10/19 12:00  04/10/19 12:00











- Labs


Labs: 


                                        





                                 04/10/19 05:54 





                                 04/10/19 05:52 





                                        











PT  13.0 SECONDS (9.7-12.2)  H  04/09/19  10:48    


 


INR  1.2   04/09/19  10:48    


 


APTT  31 SECONDS (21-34)   04/09/19  10:48    














Attending/Attestation





- Attestation


I have personally seen and examined this patient.: Yes


I have fully participated in the care of the patient.: Yes


I have reviewed all pertinent clinical information, including history, physical 

exam and plan: Yes

## 2019-04-09 NOTE — C.PDOC
History Of Present Illness


54 year old male presents to ED with complaint of recurrent chest pain that 

began prior to arrival. Patient has a PMHx of STEMI s/p stent placed 4/9. 

Patient states that he has been doing well since being discharged until now. 

Patient states that chest pain is similar to prior. Patient is compliant with 

his medication. Per EMS, possible STEMI on their EKG. Patient has a history of 

intermittent left-sided chest pain for the past month. Patient is s/p 

catheterization per  +% occlusion of the LAD and this was stented 

with Bare Metal stent. Cardiac catheterization revealed disease in the RCA  and 

[place] [facility] [place]. Patient 

also complains of dizziness and nausea. 








LIMITED DUE TO CLIN COND





RECUR CP ONSET PTA. S/P STEMI W STENT 4/9. PS HAD BEEN DOING WELL SINCE DC UNTIL

NOW. CP SIM TO PRIOR. +DIZZY, NAUSEA. COMPLIANT W MEDS. PER EMS POSSIBLE STEMI 

ON THEIR EKG








HO INTERMIT L CP X 1 MO. S/P CATH Dr. Mas +% occlusion of the LAD and 

this was stented with Bare Metal stent. Cardiac Catheterization also revealed 

disease in the RCA and [place] [facility] [place]. 





ROS LIMITED DUE TO CLIN COND





EXAM


MOD DIST


LUNGS CTA B/L NO W/R/R


CV RRR


DIAPH WARM


NO EDEMA


REMAINDER NEG


Time Seen by Provider: 04/09/19 10:45


Chief Complaint (Nursing): Chest Pain


History Per: EMS


History/Exam Limitations: clinical condition


Onset/Duration Of Symptoms: Intermittent Episodes, Other (PTA)


Current Symptoms Are (Timing): Still Present


Quality: "Pain"


Associated Symptoms: Nausea


Modifying Factors: None


Exacerbating Factors: None


Alleviating Factors: None





Past Medical History


Reviewed: Historical Data, Nursing Documentation, Vital Signs


Vital Signs: 





                                Last Vital Signs











Temp  98.8 F   04/09/19 10:40


 


Pulse  73   04/09/19 10:40


 


Resp  17   04/09/19 10:40


 


BP      


 


Pulse Ox  99   04/09/19 10:40














- Medical History


PMH: HTN


   Denies: Chronic Kidney Disease


Surgical History: Coronary Stent





- [facility] Procedures











DILATE 1 COR ART, BIFURC, W INTRALUM DEV, PERC (04/02/19)


MEASURE OF CARDIAC SAMPL & PRESSURE, L HEART, PERC APPROACH (04/02/19)








Family History: States: Unknown Family Hx





- Social History


Hx Alcohol Use:  (occ./special occ.)


Hx Substance Use: No





Review Of Systems


Cardiovascular: Positive for: Chest Pain (left-sided)


Gastrointestinal: Positive for: Nausea


Neurological: Positive for: Dizziness





Physical Exam





- Physical Exam


Appears: Other (moderate distress)


Skin: Normal Color, Warm, Diaphoretic


Head: Atraumatic, Normacephalic


Neck: Normal ROM, Supple


Chest: Symmetrical, No Deformity


Cardiovascular: Rhythm Regular, No Murmur


Respiratory: No Accessory Muscle Use, No Rales, No Rhonchi, No Wheezing, Other 

(NARD)


Gastrointestinal/Abdominal: Soft, No Tenderness


Extremity: Capillary Refill (<2 seconds), No Swelling


Pulses: Left Radial: Normal, Right Radial: Normal


Neurological/Psych: Oriented x3, Normal Speech, Normal Cognition





ED Course And Treatment





- Laboratory Results


Result Diagrams: 


                                 04/10/19 05:54





                                 04/10/19 05:52


O2 Sat by Pulse Oximetry: 99 (in RA)


Progress Note: Labs ordered with cardiac enzymes.  EKG and CXR ordered for 

patient.





Progress





- Re-Evaluation


Re-evaluation Note: 





04/09/19 10:42


D/W DR MCLEAN CODE HEART ON CALL, EKG REVIEWED. PT DOES NOT MEET CODE HEART 

CRITERIA. HEPARIN, ASA, PLAVIX, BRILLENTA





04/09/19 11:28


S/P EVAL DR MCLEAN IN ER, AWARE OF ER FINDINGS. CODE HEART REACTIVATED





- Data Reviewed


Data Reviewed: Lab, Diagnostic imaging, EKG, Old records





- Critical Care


Citical Care: Excluding Proc Time


Critical Care Time: 60 minutes





Disposition


Counseled Patient/Family Regarding: Studies Performed, Diagnosis





- Disposition


Disposition: HOSPITALIZED


Disposition Time: 11:28


Condition: STABLE





- POA


Present On Arrival: Poor Glycemic Control


Core Measure Indicators: Code Heart





- Clinical Impression


Clinical Impression: 


 STEMI (ST elevation myocardial infarction)








- Scribe Statement


The provider has reviewed the documentation as recorded by the Scribe (Ericka Hdz)








All medical record entries made by the Scribe were at my direction and 

personally dictated by me. I have reviewed the chart and agree that the record 

accurately reflects my personal performance of the history, physical exam, 

medical decision making, and the department course for this patient. I have also

personally directed, reviewed, and agree with the discharge instructions and 

disposition.

## 2019-04-09 NOTE — CP.CCUPN
CCU Objective





- Vital Signs / Intake & Output


Vital Signs (Last 4 hours): 


Vital Signs











  Temp BP


 


 04/09/19 18:04   142/78


 


 04/09/19 16:00  98 F 


 


 04/09/19 15:00  98.2 F 











Intake and Output (Last 8hrs): 


                                 Intake & Output











 04/09/19 04/09/19 04/09/19





 06:59 14:59 22:59


 


Weight  151 lb 














- Medications


Active Medications: 


Active Medications











Generic Name Dose Route Start Last Admin





  Trade Name Freq  PRN Reason Stop Dose Admin


 


Aspirin  81 mg  04/10/19 10:00  





  Ecotrin  PO   





  DAILY SHAGGY   





     





     





     





     


 


Eptifibatide  75 mg in 100 mls @ 10.959 mls/hr  04/09/19 14:00  04/09/19 13:00





  Integrilin  IV  04/10/19 14:01  10.959 mls/hr





  .Q9H8M SHAGGY   Administration





     





     





     





  2 MCG/KG/MIN   


 


Metoprolol Tartrate  25 mg  04/09/19 18:00  04/09/19 18:04





  Lopressor  PO   25 mg





  BID SHAGGY   Administration





     





     





     





     


 


Nitroglycerin  0.4 mg  04/09/19 11:00  04/09/19 10:48





  Nitrostat Sl Tab  SL   0.4 mg





  Q5M PRN   Administration





  Other   





     





     





     


 


Pantoprazole Sodium  40 mg  04/10/19 10:00  





  Protonix Ec Tab  PO   





  DAILY SHAGGY   





     





     





     





     


 


Rosuvastatin Calcium  40 mg  04/09/19 22:00  





  Crestor  PO   





  HS SHAGGY   





     





     





     





     


 


Ticagrelor  90 mg  04/09/19 18:00  04/09/19 18:05





  Brilinta  PO   90 mg





  BID SHAGGY   Administration





     





     





     





     














- Patient Studies


Lab Studies: 


                                   Lab Studies











  04/09/19 04/09/19 04/09/19 Range/Units





  17:09 16:30 10:48 


 


WBC     (4.8-10.8)  K/uL


 


RBC     (4.40-5.90)  Mil/uL


 


Hgb     (12.0-18.0)  g/dL


 


Hct     (35.0-51.0)  %


 


MCV     (80.0-94.0)  fL


 


MCH     (27.0-31.0)  pg


 


MCHC     (33.0-37.0)  g/dL


 


RDW     (11.5-14.5)  %


 


Plt Count     (130-400)  K/uL


 


MPV     (7.2-11.7)  fL


 


Neut % (Auto)     (50.0-75.0)  %


 


Lymph % (Auto)     (20.0-40.0)  %


 


Mono % (Auto)     (0.0-10.0)  %


 


Eos % (Auto)     (0.0-4.0)  %


 


Baso % (Auto)     (0.0-2.0)  %


 


Neut # (Auto)     (1.8-7.0)  K/uL


 


Lymph # (Auto)     (1.0-4.3)  K/uL


 


Mono # (Auto)     (0.0-0.8)  K/uL


 


Eos # (Auto)     (0.0-0.7)  K/uL


 


Baso # (Auto)     (0.0-0.2)  K/uL


 


PT     (9.7-12.2)  SECONDS


 


INR     


 


APTT     (21-34)  SECONDS


 


Sodium     (132-148)  mmol/L


 


Potassium     (3.6-5.2)  mmol/L


 


Chloride     ()  mmol/L


 


Carbon Dioxide     (22-30)  mmol/L


 


Anion Gap     (10-20)  


 


BUN     (9-20)  mg/dL


 


Creatinine     (0.8-1.5)  mg/dL


 


Est GFR (African Amer)     


 


Est GFR (Non-Af Amer)     


 


POC Glucose (mg/dL)   131 H   ()  mg/dL


 


Random Glucose     ()  mg/dL


 


Calcium     (8.6-10.4)  mg/dl


 


Total Bilirubin     (0.2-1.3)  mg/dL


 


AST     (17-59)  U/L


 


ALT     (21-72)  U/L


 


Alkaline Phosphatase     ()  U/L


 


Lactate Dehydrogenase     (313-618)  U/L


 


Total Creatine Kinase  1213 H    ()  U/L


 


CK-MB (Mass)  54.0 H    (0.0-3.38)  ng/mL


 


Troponin I  49.2000 H*    (0.00-0.120)  ng/mL


 


Total Protein     (6.3-8.3)  g/dL


 


Albumin     (3.5-5.0)  g/dL


 


Globulin     (2.2-3.9)  gm/dL


 


Albumin/Globulin Ratio     (1.0-2.1)  


 


Blood Type    O POSITIVE  


 


Antibody Screen    Negative  














  04/09/19 04/09/19 04/09/19 Range/Units





  10:48 10:48 10:48 


 


WBC    10.5  (4.8-10.8)  K/uL


 


RBC    4.09 L  (4.40-5.90)  Mil/uL


 


Hgb    12.8  (12.0-18.0)  g/dL


 


Hct    37.9  (35.0-51.0)  %


 


MCV    92.6  (80.0-94.0)  fL


 


MCH    31.2 H  (27.0-31.0)  pg


 


MCHC    33.7  (33.0-37.0)  g/dL


 


RDW    13.7  (11.5-14.5)  %


 


Plt Count    213  (130-400)  K/uL


 


MPV    8.6  (7.2-11.7)  fL


 


Neut % (Auto)    43.4 L  (50.0-75.0)  %


 


Lymph % (Auto)    36.6  (20.0-40.0)  %


 


Mono % (Auto)    8.4  (0.0-10.0)  %


 


Eos % (Auto)    10.8 H  (0.0-4.0)  %


 


Baso % (Auto)    0.8  (0.0-2.0)  %


 


Neut # (Auto)    4.6  (1.8-7.0)  K/uL


 


Lymph # (Auto)    3.8  (1.0-4.3)  K/uL


 


Mono # (Auto)    0.9 H  (0.0-0.8)  K/uL


 


Eos # (Auto)    1.1 H  (0.0-0.7)  K/uL


 


Baso # (Auto)    0.1  (0.0-0.2)  K/uL


 


PT   13.0 H   (9.7-12.2)  SECONDS


 


INR   1.2   


 


APTT   31   (21-34)  SECONDS


 


Sodium  132    (132-148)  mmol/L


 


Potassium  4.1    (3.6-5.2)  mmol/L


 


Chloride  98    ()  mmol/L


 


Carbon Dioxide  21 L    (22-30)  mmol/L


 


Anion Gap  17    (10-20)  


 


BUN  21 H    (9-20)  mg/dL


 


Creatinine  0.8    (0.8-1.5)  mg/dL


 


Est GFR (African Amer)  > 60    


 


Est GFR (Non-Af Amer)  > 60    


 


POC Glucose (mg/dL)     ()  mg/dL


 


Random Glucose  193 H D    ()  mg/dL


 


Calcium  9.9    (8.6-10.4)  mg/dl


 


Total Bilirubin  0.6    (0.2-1.3)  mg/dL


 


AST  36    (17-59)  U/L


 


ALT  24    (21-72)  U/L


 


Alkaline Phosphatase  100    ()  U/L


 


Lactate Dehydrogenase  791 H    (313-618)  U/L


 


Total Creatine Kinase  135    ()  U/L


 


CK-MB (Mass)     (0.0-3.38)  ng/mL


 


Troponin I  1.1600 H*    (0.00-0.120)  ng/mL


 


Total Protein  8.1    (6.3-8.3)  g/dL


 


Albumin  4.6    (3.5-5.0)  g/dL


 


Globulin  3.5    (2.2-3.9)  gm/dL


 


Albumin/Globulin Ratio  1.3    (1.0-2.1)  


 


Blood Type     


 


Antibody Screen     








                         Laboratory Results - last 24 hr











  04/09/19 04/09/19 04/09/19





  10:48 10:48 10:48


 


WBC  10.5  


 


RBC  4.09 L  


 


Hgb  12.8  


 


Hct  37.9  


 


MCV  92.6  


 


MCH  31.2 H  


 


MCHC  33.7  


 


RDW  13.7  


 


Plt Count  213  


 


MPV  8.6  


 


Neut % (Auto)  43.4 L  


 


Lymph % (Auto)  36.6  


 


Mono % (Auto)  8.4  


 


Eos % (Auto)  10.8 H  


 


Baso % (Auto)  0.8  


 


Neut # (Auto)  4.6  


 


Lymph # (Auto)  3.8  


 


Mono # (Auto)  0.9 H  


 


Eos # (Auto)  1.1 H  


 


Baso # (Auto)  0.1  


 


PT   13.0 H 


 


INR   1.2 


 


APTT   31 


 


Sodium    132


 


Potassium    4.1


 


Chloride    98


 


Carbon Dioxide    21 L


 


Anion Gap    17


 


BUN    21 H


 


Creatinine    0.8


 


Est GFR ( Amer)    > 60


 


Est GFR (Non-Af Amer)    > 60


 


POC Glucose (mg/dL)   


 


Random Glucose    193 H D


 


Calcium    9.9


 


Total Bilirubin    0.6


 


AST    36


 


ALT    24


 


Alkaline Phosphatase    100


 


Lactate Dehydrogenase    791 H


 


Total Creatine Kinase    135


 


CK-MB (Mass)   


 


Troponin I    1.1600 H*


 


Total Protein    8.1


 


Albumin    4.6


 


Globulin    3.5


 


Albumin/Globulin Ratio    1.3


 


Blood Type   


 


Antibody Screen   














  04/09/19 04/09/19 04/09/19





  10:48 16:30 17:09


 


WBC   


 


RBC   


 


Hgb   


 


Hct   


 


MCV   


 


MCH   


 


MCHC   


 


RDW   


 


Plt Count   


 


MPV   


 


Neut % (Auto)   


 


Lymph % (Auto)   


 


Mono % (Auto)   


 


Eos % (Auto)   


 


Baso % (Auto)   


 


Neut # (Auto)   


 


Lymph # (Auto)   


 


Mono # (Auto)   


 


Eos # (Auto)   


 


Baso # (Auto)   


 


PT   


 


INR   


 


APTT   


 


Sodium   


 


Potassium   


 


Chloride   


 


Carbon Dioxide   


 


Anion Gap   


 


BUN   


 


Creatinine   


 


Est GFR ( Amer)   


 


Est GFR (Non-Af Amer)   


 


POC Glucose (mg/dL)   131 H 


 


Random Glucose   


 


Calcium   


 


Total Bilirubin   


 


AST   


 


ALT   


 


Alkaline Phosphatase   


 


Lactate Dehydrogenase   


 


Total Creatine Kinase    1213 H


 


CK-MB (Mass)    54.0 H


 


Troponin I    49.2000 H*


 


Total Protein   


 


Albumin   


 


Globulin   


 


Albumin/Globulin Ratio   


 


Blood Type  O POSITIVE  


 


Antibody Screen  Negative  











Radiology Impressions: 


                              Radiology Impressions





Chest X-Ray  04/09/19 10:44


IMPRESSION:


Probable top-normal pulmonary vasculature.  And borderline 


[id number] 


 


Other findings as above. 


 


 











EKG/Cardiology Studies: 


Cardiology / EKG Studies





04/09/19 10:39


ELECTROCARDIOGRAM Stat 


   Comment: 


   Mode Of Transportation: BED


   Reason For Exam: chest pain





04/09/19 10:42


ELECTROCARDIOGRAM Stat 


   Comment: 


   Mode Of Transportation: BED


   Reason For Exam: chest pain





04/09/19 10:44


ELECTROCARDIOGRAM Stat 


   Comment: 


   Mode Of Transportation: BED


   Reason For Exam: chest pain





04/09/19 16:44


EKG [ELECTROCARDIOGRAM] Stat 


   Comment: 


   Mode Of Transportation: 


   Reason For Exam: chest pain














Critical Care Progress Note





- Nutrition


Nutrition: 


                                    Nutrition











 Category Date Time Status


 


 Heart Healthy Diet [DIET] Diets  04/09/19 Dinner Active

## 2019-04-09 NOTE — CP.PCM.CON
History of Present Illness





- History of Present Illness


History of Present Illness: 





PGY-1 ICU History and Physical for Dr. Ascencio





Patient is a 54 year old male with PMHx HTN, HLD, and CAD who presents with 

acute onset chest pain ending up called as a code heart for STEMI in the ED.  

Patient had a recent STEMI on last admission at [facility] one week ago and was 

taken to cath lab where bare metal stent was placed for 100% occluded proximal 

LAD.  Patient was discharged on appropriate medical therapy but per patient may 

have stopped taking Plavix and only taking ASA upon discharge.  On presentation 

today patient states he was sitting on the couch watching TV when we felt 

sudden-onset squeezing left-sided chest pain 10/10 and new immediately to come 

to hospital bc he felt he was having a heart attack.  Code heart called in ED 

for STEMI with ST elevations in V1-V4 with elevated troponins.  Code heart 

initiated and patient taken directly to cath lab for PCI with Dr. Johns.





PMHx: HTN


PSHx: Denies


Allergies: NKDA


Social: occasional alcohol use, denies tobacco and illicit drugs. Works as a 

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


Family Hx: Denies hx of CAD, HTN, MI, DM, stroke and cancer





Meds: Recently discharged from [facility] on the following medications 


-Clopidogrel 75 mg, 1 tablet by mouth once a day at 8 AM


-Atorvastatin 40 mg, 1 tablet by mouth once a day at 8 PM


-Lisinopril 2.5 mg, 1 tablet by mouth once a day at 2 PM


-Aspirin 81 mg, 1 tablet by mouth once a day at 8 AM


-Metoprolol 12.5 mg, 1 tablet by mouth two time a day at 8 AM and 8 PM





Review of Systems





- Review of Systems


Review of Systems: 





As per complete H and P





Past Patient History





- Past Medical History & Family History


Past Medical History?: Yes





- Past Social History


Smoking Status: Never Smoked





- CARDIAC


Hx Hypertension: Yes





- PULMONARY


Hx Respiratory Disorders: No





- NEUROLOGICAL


Hx Neurological Disorder: No





- HEENT


Hx HEENT Problems: No





- RENAL


Hx Chronic Kidney Disease: No





- ENDOCRINE/METABOLIC


Hx Endocrine Disorders: No





- HEMATOLOGICAL/ONCOLOGICAL


Hx Blood Disorders: No





- INTEGUMENTARY


Hx Dermatological Problems: No





- MUSCULOSKELETAL/RHEUMATOLOGICAL


Hx Musculoskeletal Disorders: No





- GASTROINTESTINAL


Hx Gastrointestinal Disorders: No





- GENITOURINARY/GYNECOLOGICAL


Hx Genitourinary Disorders: No





- PSYCHIATRIC


Hx Substance Use: No





- SURGICAL HISTORY


Hx Surgeries: No





- ANESTHESIA


Hx Anesthesia: Yes


Hx Anesthesia Reactions: No


Hx Malignant Hyperthermia: No





Meds


Allergies/Adverse Reactions: 


                                    Allergies











Allergy/AdvReac Type Severity Reaction Status Date / Time


 


No Known Allergies Allergy   Verified 04/09/19 10:39














- Medications


Medications: 


                               Current Medications





Aspirin (Ecotrin)  81 mg PO DAILY [facility]


Eptifibatide (Integrilin)  75 mg in 100 mls @ 10.959 mls/hr IV .Q9H8M [facility]


   Stop: 04/10/19 14:01


   Last Admin: 04/09/19 13:00 Dose:  10.959 mls/hr


Metoprolol Tartrate (Lopressor)  25 mg PO BID [facility]


   Last Admin: 04/09/19 18:04 Dose:  25 mg


Nitroglycerin (Nitrostat Sl Tab)  0.4 mg SL Q5M PRN


   PRN Reason: Other


   Last Admin: 04/09/19 10:48 Dose:  0.4 mg


Pantoprazole Sodium (Protonix Ec Tab)  40 mg PO DAILY [facility]


Rosuvastatin Calcium (Crestor)  40 mg PO HS [facility]


Ticagrelor (Brilinta)  90 mg PO BID [facility]


   Last Admin: 04/09/19 18:05 Dose:  90 mg











Physical Exam





- Constitutional


Additional comments: 





- Constitutional


Appears: Non-toxic, No Acute Distress





- Head Exam


Head Exam: ATRAUMATIC, NORMOCEPHALIC





- Eye Exam


Eye Exam: EOMI, Normal appearance





- ENT Exam


ENT Exam: Mucous Membranes Moist





- Respiratory Exam


Respiratory Exam: Clear to Auscultation Bilateral.  absent: Rhonchi, Wheezes





- Cardiovascular Exam


Cardiovascular Exam: REGULAR RHYTHM, +S1, +S2





- GI/Abdominal Exam


GI & Abdominal Exam: Normal Bowel Sounds, Soft.  absent: Tenderness





- Extremities Exam


Extremities exam: Positive for: normal inspection.  Negative for: pedal edema, 

tenderness





- Neurological Exam


Neurological exam: Alert, CN II-XII Intact, Oriented x3





- Psychiatric Exam


Psychiatric exam: Normal Affect, Normal Mood





- Skin


Skin Exam: Dry, Intact





Results





- Vital Signs


Recent Vital Signs: 


                                Last Vital Signs











Temp  98 F   04/09/19 16:00


 


Pulse  62   04/09/19 11:13


 


Resp  14   04/09/19 11:13


 


BP  142/78   04/09/19 18:04


 


Pulse Ox  99   04/09/19 11:29














- Labs


Result Diagrams: 


                                 04/09/19 10:48





                                 04/09/19 10:48


Labs: 


                         Laboratory Results - last 24 hr











  04/09/19 04/09/19 04/09/19





  10:48 10:48 10:48


 


WBC  10.5  


 


RBC  4.09 L  


 


Hgb  12.8  


 


Hct  37.9  


 


MCV  92.6  


 


MCH  31.2 H  


 


MCHC  33.7  


 


RDW  13.7  


 


Plt Count  213  


 


MPV  8.6  


 


Neut % (Auto)  43.4 L  


 


Lymph % (Auto)  36.6  


 


Mono % (Auto)  8.4  


 


Eos % (Auto)  10.8 H  


 


Baso % (Auto)  0.8  


 


Neut # (Auto)  4.6  


 


Lymph # (Auto)  3.8  


 


Mono # (Auto)  0.9 H  


 


Eos # (Auto)  1.1 H  


 


Baso # (Auto)  0.1  


 


PT   13.0 H 


 


INR   1.2 


 


APTT   31 


 


Sodium    132


 


Potassium    4.1


 


Chloride    98


 


Carbon Dioxide    21 L


 


Anion Gap    17


 


BUN    21 H


 


Creatinine    0.8


 


Est GFR ( Amer)    > 60


 


Est GFR (Non-Af Amer)    > 60


 


POC Glucose (mg/dL)   


 


Random Glucose    193 H D


 


Calcium    9.9


 


Total Bilirubin    0.6


 


AST    36


 


ALT    24


 


Alkaline Phosphatase    100


 


Lactate Dehydrogenase    791 H


 


Total Creatine Kinase    135


 


CK-MB (Mass)   


 


Troponin I    1.1600 H*


 


Total Protein    8.1


 


Albumin    4.6


 


Globulin    3.5


 


Albumin/Globulin Ratio    1.3


 


Blood Type   


 


Antibody Screen   














  04/09/19 04/09/19 04/09/19





  10:48 16:30 17:09


 


WBC   


 


RBC   


 


Hgb   


 


Hct   


 


MCV   


 


MCH   


 


MCHC   


 


RDW   


 


Plt Count   


 


MPV   


 


Neut % (Auto)   


 


Lymph % (Auto)   


 


Mono % (Auto)   


 


Eos % (Auto)   


 


Baso % (Auto)   


 


Neut # (Auto)   


 


Lymph # (Auto)   


 


Mono # (Auto)   


 


Eos # (Auto)   


 


Baso # (Auto)   


 


PT   


 


INR   


 


APTT   


 


Sodium   


 


Potassium   


 


Chloride   


 


Carbon Dioxide   


 


Anion Gap   


 


BUN   


 


Creatinine   


 


Est GFR ( Amer)   


 


Est GFR (Non-Af Amer)   


 


POC Glucose (mg/dL)   131 H 


 


Random Glucose   


 


Calcium   


 


Total Bilirubin   


 


AST   


 


ALT   


 


Alkaline Phosphatase   


 


Lactate Dehydrogenase   


 


Total Creatine Kinase    1213 H


 


CK-MB (Mass)    54.0 H


 


Troponin I    49.2000 H*


 


Total Protein   


 


Albumin   


 


Globulin   


 


Albumin/Globulin Ratio   


 


Blood Type  O POSITIVE  


 


Antibody Screen  Negative  














Assessment & Plan





- Assessment and Plan (Free Text)


Assessment: 








54 year old male with PMHx CAD, recent STEMI s/p stent proximal LAD presents 

with chief complaint chest pain, code heart called, patient found to have new-

STEMI and re-occlusion proximal LAD upon PCI.





Cardio


Acute MI s/p PCI


-Code heart called in ED for STEMI with St elevations V1-V4


-Heparin drip started, as well as stat doses ASA and plavix





Per Cardio, Dr. Johns


-Patient taken directly for PCI at [facility], where pt was found to have 

re-occlusion of proximal LAD with 100% stenosis, as well as 75% stenosis of 

circumflex.  2 stents placed to proximal LAD, with circumflex remaining 

partially-stenosed


-Medications started


--ASA 81 daily


--Plavix 75 mg PO dailly


--Metoprolol 25 mg PO BID


--Integrillin drip per [facility] protocol x 12 hours





-Heparin drip d/c's





-Recent echo showed normal EF


-Repeat echo - f/u





CXR 4/9: Probable top-normal pulmonary vasculature.  And borderline 


[id number] 





-CT surgery consulted, Dr. Limon for evaluation for potential 2-vessel CABG


--Patient is likely transfer to [facility] for CABG





Pulm


-Extubated post-PCI, breathing comfortably on room air





Neuro


-A and O x3 no deficits





Heme


-No acute disease





Renal


-Monitor BUN/Cr





ID


-No active dz





PPx


-DVT: pt currently on integrillin drip, hep d/c'd


-GI ppx not indicated





Assessment and plan d/w Dr. Sonu Patricia, PGY-1

## 2019-04-09 NOTE — CP.PCM.CON
History of Present Illness





- History of Present Illness


History of Present Illness: 





Patient s/p STEMI


LAD stented


L Cx: Ostial 70% stenosis


EF 40%





Medical therapy for now. 


Plan d/w the medical team





Past Patient History





- Past Medical History & Family History


Past Medical History?: Yes





- Past Social History


Smoking Status: Never Smoked





- CARDIAC


Hx Hypertension: Yes





- PULMONARY


Hx Respiratory Disorders: No





- NEUROLOGICAL


Hx Neurological Disorder: No





- HEENT


Hx HEENT Problems: No





- RENAL


Hx Chronic Kidney Disease: No





- ENDOCRINE/METABOLIC


Hx Endocrine Disorders: No





- HEMATOLOGICAL/ONCOLOGICAL


Hx Blood Disorders: No





- INTEGUMENTARY


Hx Dermatological Problems: No





- MUSCULOSKELETAL/RHEUMATOLOGICAL


Hx Musculoskeletal Disorders: No





- GASTROINTESTINAL


Hx Gastrointestinal Disorders: No





- GENITOURINARY/GYNECOLOGICAL


Hx Genitourinary Disorders: No





- PSYCHIATRIC


Hx Substance Use: No





- SURGICAL HISTORY


Hx Surgeries: No





- ANESTHESIA


Hx Anesthesia: Yes


Hx Anesthesia Reactions: No


Hx Malignant Hyperthermia: No





Meds


Allergies/Adverse Reactions: 


                                    Allergies











Allergy/AdvReac Type Severity Reaction Status Date / Time


 


No Known Allergies Allergy   Verified 04/09/19 10:39














- Medications


Medications: 


                               Current Medications





Eptifibatide (Integrilin)  75 mg in 100 mls @ 10.959 mls/hr IV .Q9H8M SHAGGY


   Stop: 04/10/19 01:31


Nitroglycerin (Nitrostat Sl Tab)  0.4 mg SL Q5M PRN


   PRN Reason: Other


   Last Admin: 04/09/19 10:48 Dose:  0.4 mg











Results





- Vital Signs


Recent Vital Signs: 


                                Last Vital Signs











Temp  98.8 F   04/09/19 10:40


 


Pulse  62   04/09/19 11:13


 


Resp  14   04/09/19 11:13


 


BP  137/81   04/09/19 11:13


 


Pulse Ox  99   04/09/19 11:29














- Labs


Result Diagrams: 


                                 04/09/19 10:48





                                 04/09/19 10:48


Labs: 


                         Laboratory Results - last 24 hr











  04/09/19 04/09/19 04/09/19





  10:48 10:48 10:48


 


WBC  10.5  


 


RBC  4.09 L  


 


Hgb  12.8  


 


Hct  37.9  


 


MCV  92.6  


 


MCH  31.2 H  


 


MCHC  33.7  


 


RDW  13.7  


 


Plt Count  213  


 


MPV  8.6  


 


Neut % (Auto)  43.4 L  


 


Lymph % (Auto)  36.6  


 


Mono % (Auto)  8.4  


 


Eos % (Auto)  10.8 H  


 


Baso % (Auto)  0.8  


 


Neut # (Auto)  4.6  


 


Lymph # (Auto)  3.8  


 


Mono # (Auto)  0.9 H  


 


Eos # (Auto)  1.1 H  


 


Baso # (Auto)  0.1  


 


PT   13.0 H 


 


INR   1.2 


 


APTT   31 


 


Sodium    132


 


Potassium    4.1


 


Chloride    98


 


Carbon Dioxide    21 L


 


Anion Gap    17


 


BUN    21 H


 


Creatinine    0.8


 


Est GFR ( Amer)    > 60


 


Est GFR (Non-Af Amer)    > 60


 


Random Glucose    193 H D


 


Calcium    9.9


 


Total Bilirubin    0.6


 


AST    36


 


ALT    24


 


Alkaline Phosphatase    100


 


Lactate Dehydrogenase    791 H


 


Total Creatine Kinase    135


 


Troponin I    1.1600 H*


 


Total Protein    8.1


 


Albumin    4.6


 


Globulin    3.5


 


Albumin/Globulin Ratio    1.3


 


Blood Type   


 


Antibody Screen   














  04/09/19





  10:48


 


WBC 


 


RBC 


 


Hgb 


 


Hct 


 


MCV 


 


MCH 


 


MCHC 


 


RDW 


 


Plt Count 


 


MPV 


 


Neut % (Auto) 


 


Lymph % (Auto) 


 


Mono % (Auto) 


 


Eos % (Auto) 


 


Baso % (Auto) 


 


Neut # (Auto) 


 


Lymph # (Auto) 


 


Mono # (Auto) 


 


Eos # (Auto) 


 


Baso # (Auto) 


 


PT 


 


INR 


 


APTT 


 


Sodium 


 


Potassium 


 


Chloride 


 


Carbon Dioxide 


 


Anion Gap 


 


BUN 


 


Creatinine 


 


Est GFR ( Amer) 


 


Est GFR (Non-Af Amer) 


 


Random Glucose 


 


Calcium 


 


Total Bilirubin 


 


AST 


 


ALT 


 


Alkaline Phosphatase 


 


Lactate Dehydrogenase 


 


Total Creatine Kinase 


 


Troponin I 


 


Total Protein 


 


Albumin 


 


Globulin 


 


Albumin/Globulin Ratio 


 


Blood Type  O POSITIVE


 


Antibody Screen  Negative

## 2019-04-09 NOTE — CP.PCM.HP
<ReglaandraCipriano - Last Filed: 04/09/19 18:28>





History of Present Illness





- History of Present Illness


History of Present Illness: 





PGY-1 ICU History and Physical for Dr. Huffman





Patient is a 54 year old male with PMHx HTN, HLD, and CAD who presents with 

acute onset chest pain ending up called as a code heart for STEMI in the ED.  

Patient had a recent STEMI on last admission at [facility] one week ago and was 

taken to cath lab where bare metal stent was placed for 100% occluded proximal 

LAD.  Patient was discharged on appropriate medical therapy but per patient may 

have stopped taking Plavix and only taking ASA upon discharge.  On presentation 

today patient states he was sitting on the couch watching TV when we felt 

sudden-onset squeezing left-sided chest pain 10/10 and new immediately to come 

to hospital bc he felt he was having a heart attack.  Code heart called in ED 

for STEMI with ST elevations in V1-V4 with elevated troponins.  Code heart 

initiated and patient taken directly to cath lab for PCI with Dr. Johns.





PMHx: HTN


PSHx: Denies


Allergies: NKDA


Social: occasional alcohol use, denies tobacco and illicit drugs. Works as a 

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


Family Hx: Denies hx of CAD, HTN, MI, DM, stroke and cancer





Meds: Recently discharged from [facility] on the following medications 


-Clopidogrel 75 mg, 1 tablet by mouth once a day at 8 AM


-Atorvastatin 40 mg, 1 tablet by mouth once a day at 8 PM


-Lisinopril 2.5 mg, 1 tablet by mouth once a day at 2 PM


-Aspirin 81 mg, 1 tablet by mouth once a day at 8 AM


-Metoprolol 12.5 mg, 1 tablet by mouth two time a day at 8 AM and 8 PM





Present on Admission





- Present on Admission


Any Indicators Present on Admission: No





Review of Systems





- Constitutional


Constitutional: absent: Fever, Weight Loss, Weakness





- EENT


Eyes: absent: Blurred Vision, Change in Vision


Nose/Mouth/Throat: absent: Nasal Congestion, Nasal Discharge





- Cardiovascular


Cardiovascular: Chest Pain, Pain Radiating to Arm/Neck/Jaw (L-sided chest pain 

radiating to L shoulder).  absent: Dyspnea, Leg Edema





- Respiratory


Respiratory: absent: Cough, Dyspnea





- Genitourinary


Genitourinary: absent: Dysuria, Flank Pain





- Musculoskeletal


Musculoskeletal: absent: Back Pain, Neck Pain





- Neurological


Neurological: absent: Dizziness, Headaches





- Psychiatric


Psychiatric: absent: Anxiety, Depression





- Hematologic/Lymphatic


Hematologic: absent: Easy Bleeding, Easy Bruising





Past Patient History





- Past Medical History & Family History


Past Medical History?: Yes





- Past Social History


Smoking Status: Never Smoked





- CARDIAC


Hx Hypertension: Yes





- PULMONARY


Hx Respiratory Disorders: No





- NEUROLOGICAL


Hx Neurological Disorder: No





- HEENT


Hx HEENT Problems: No





- RENAL


Hx Chronic Kidney Disease: No





- ENDOCRINE/METABOLIC


Hx Endocrine Disorders: No





- HEMATOLOGICAL/ONCOLOGICAL


Hx Blood Disorders: No





- INTEGUMENTARY


Hx Dermatological Problems: No





- MUSCULOSKELETAL/RHEUMATOLOGICAL


Hx Musculoskeletal Disorders: No





- GASTROINTESTINAL


Hx Gastrointestinal Disorders: No





- GENITOURINARY/GYNECOLOGICAL


Hx Genitourinary Disorders: No





- PSYCHIATRIC


Hx Substance Use: No





- SURGICAL HISTORY


Hx Surgeries: No





- ANESTHESIA


Hx Anesthesia: Yes


Hx Anesthesia Reactions: No


Hx Malignant Hyperthermia: No





Meds


Home Medications: 


                              Home Medication List











 Medication  Instructions  Recorded  Confirmed  Type


 


Famotidine [Pepcid] 20 mg PO BID  tab 04/10/19  Rx


 


Metoprolol Tartrate [Lopressor] 25 mg PO BID  tab 04/10/19  Rx


 


Rosuvastatin Calcium [Crestor] 40 mg PO HS  tab 04/10/19  Rx











Allergies/Adverse Reactions: 


                                    Allergies











Allergy/AdvReac Type Severity Reaction Status Date / Time


 


No Known Allergies Allergy   Verified 04/09/19 10:39














Physical Exam





- Constitutional


Appears: Non-toxic, No Acute Distress





- Head Exam


Head Exam: ATRAUMATIC, NORMOCEPHALIC





- Eye Exam


Eye Exam: EOMI, Normal appearance





- ENT Exam


ENT Exam: Mucous Membranes Moist





- Respiratory Exam


Respiratory Exam: Clear to Auscultation Bilateral.  absent: Rhonchi, Wheezes





- Cardiovascular Exam


Cardiovascular Exam: REGULAR RHYTHM, +S1, +S2





- GI/Abdominal Exam


GI & Abdominal Exam: Normal Bowel Sounds, Soft.  absent: Tenderness





- Extremities Exam


Extremities exam: Positive for: normal inspection.  Negative for: pedal edema, 

tenderness





- Neurological Exam


Neurological exam: Alert, CN II-XII Intact, Oriented x3





- Psychiatric Exam


Psychiatric exam: Normal Affect, Normal Mood





- Skin


Skin Exam: Dry, Intact





Results





- Vital Signs


Recent Vital Signs: 





                                Last Vital Signs











Temp  98.8 F   04/09/19 10:40


 


Pulse  62   04/09/19 11:13


 


Resp  14   04/09/19 11:13


 


BP  137/81   04/09/19 11:13


 


Pulse Ox  99   04/09/19 11:29














- Labs


Result Diagrams: 


                                 04/09/19 10:48





                                 04/09/19 10:48


Labs: 





                         Laboratory Results - last 24 hr











  04/09/19 04/09/19 04/09/19





  10:48 10:48 10:48


 


WBC  10.5  


 


RBC  4.09 L  


 


Hgb  12.8  


 


Hct  37.9  


 


MCV  92.6  


 


MCH  31.2 H  


 


MCHC  33.7  


 


RDW  13.7  


 


Plt Count  213  


 


MPV  8.6  


 


Neut % (Auto)  43.4 L  


 


Lymph % (Auto)  36.6  


 


Mono % (Auto)  8.4  


 


Eos % (Auto)  10.8 H  


 


Baso % (Auto)  0.8  


 


Neut # (Auto)  4.6  


 


Lymph # (Auto)  3.8  


 


Mono # (Auto)  0.9 H  


 


Eos # (Auto)  1.1 H  


 


Baso # (Auto)  0.1  


 


PT   13.0 H 


 


INR   1.2 


 


APTT   31 


 


Sodium    132


 


Potassium    4.1


 


Chloride    98


 


Carbon Dioxide    21 L


 


Anion Gap    17


 


BUN    21 H


 


Creatinine    0.8


 


Est GFR ( Amer)    > 60


 


Est GFR (Non-Af Amer)    > 60


 


Random Glucose    193 H D


 


Calcium    9.9


 


Total Bilirubin    0.6


 


AST    36


 


ALT    24


 


Alkaline Phosphatase    100


 


Lactate Dehydrogenase    791 H


 


Total Creatine Kinase    135


 


Troponin I    1.1600 H*


 


Total Protein    8.1


 


Albumin    4.6


 


Globulin    3.5


 


Albumin/Globulin Ratio    1.3


 


Blood Type   


 


Antibody Screen   














  04/09/19





  10:48


 


WBC 


 


RBC 


 


Hgb 


 


Hct 


 


MCV 


 


MCH 


 


MCHC 


 


RDW 


 


Plt Count 


 


MPV 


 


Neut % (Auto) 


 


Lymph % (Auto) 


 


Mono % (Auto) 


 


Eos % (Auto) 


 


Baso % (Auto) 


 


Neut # (Auto) 


 


Lymph # (Auto) 


 


Mono # (Auto) 


 


Eos # (Auto) 


 


Baso # (Auto) 


 


PT 


 


INR 


 


APTT 


 


Sodium 


 


Potassium 


 


Chloride 


 


Carbon Dioxide 


 


Anion Gap 


 


BUN 


 


Creatinine 


 


Est GFR ( Amer) 


 


Est GFR (Non-Af Amer) 


 


Random Glucose 


 


Calcium 


 


Total Bilirubin 


 


AST 


 


ALT 


 


Alkaline Phosphatase 


 


Lactate Dehydrogenase 


 


Total Creatine Kinase 


 


Troponin I 


 


Total Protein 


 


Albumin 


 


Globulin 


 


Albumin/Globulin Ratio 


 


Blood Type  O POSITIVE


 


Antibody Screen  Negative














Assessment & Plan





- Assessment and Plan (Free Text)


Assessment: 


54 year old male with PMHx CAD, recent STEMI s/p stent proximal LAD presents 

with chief complaint chest pain, code heart called, patient found to have new-

STEMI and re-occlusion proximal LAD upon PCI.





Acute MI s/p PCI


-Code heart called in ED for STEMI with St elevations V1-V4


-Heparin drip started, as well as stat doses ASA and plavix





Per Cardio, Dr. Johns


-Patient taken directly for PCI at [facility], where pt was found to have 

re-occlusion of proximal LAD with 100% stenosis, as well as 75% stenosis of 

circumflex.  2 stents placed to proximal LAD, with circumflex remaining 

partially-stenosed


-Medications started


--ASA 81 daily


--Plavix 75 mg PO dailly


--Metoprolol 25 mg PO BID


--Integrillin drip per ACS protocol x 12 hours





-Heparin drip d/c's





-Recent echo showed normal EF


-Repeat echo - f/u





CXR 4/9: Probable top-normal pulmonary vasculature.  And borderline 


hqtkicwzrhdl-lsojeor-[id number] 





-CT surgery consulted, Dr. Limon for evaluation for potential 2-vessel CABG


--Patient is likely transfer to [facility] for CABG





PPx


-DVT: pt currently on integrillin drip, hep d/c'd


-GI ppx not indicated





Assessment and plan d/w Dr. Nathanael Patricia, PGY-1





<Romana Huffman - Last Filed: 04/10/19 09:50>





Results





- Vital Signs


Recent Vital Signs: 





                                Last Vital Signs











Temp  98.1 F   04/09/19 16:00


 


Pulse  73   04/09/19 18:00


 


Resp  14   04/09/19 18:00


 


BP  142/78   04/09/19 18:04


 


Pulse Ox  99   04/09/19 11:29














- Labs


Result Diagrams: 


                                 04/10/19 05:54





                                 04/10/19 05:52


Labs: 





                         Laboratory Results - last 24 hr











  04/09/19 04/09/19 04/09/19





  10:48 10:48 10:48


 


WBC  10.5  


 


RBC  4.09 L  


 


Hgb  12.8  


 


Hct  37.9  


 


MCV  92.6  


 


MCH  31.2 H  


 


MCHC  33.7  


 


RDW  13.7  


 


Plt Count  213  


 


MPV  8.6  


 


Neut % (Auto)  43.4 L  


 


Lymph % (Auto)  36.6  


 


Mono % (Auto)  8.4  


 


Eos % (Auto)  10.8 H  


 


Baso % (Auto)  0.8  


 


Neut # (Auto)  4.6  


 


Lymph # (Auto)  3.8  


 


Mono # (Auto)  0.9 H  


 


Eos # (Auto)  1.1 H  


 


Baso # (Auto)  0.1  


 


PT   13.0 H 


 


INR   1.2 


 


APTT   31 


 


Sodium    132


 


Potassium    4.1


 


Chloride    98


 


Carbon Dioxide    21 L


 


Anion Gap    17


 


BUN    21 H


 


Creatinine    0.8


 


Est GFR ( Amer)    > 60


 


Est GFR (Non-Af Amer)    > 60


 


POC Glucose (mg/dL)   


 


Random Glucose    193 H D


 


Calcium    9.9


 


Total Bilirubin    0.6


 


AST    36


 


ALT    24


 


Alkaline Phosphatase    100


 


Lactate Dehydrogenase    791 H


 


Total Creatine Kinase    135


 


CK-MB (Mass)   


 


Troponin I    1.1600 H*


 


Total Protein    8.1


 


Albumin    4.6


 


Globulin    3.5


 


Albumin/Globulin Ratio    1.3


 


Blood Type   


 


Antibody Screen   














  04/09/19 04/09/19 04/09/19





  10:48 16:30 17:09


 


WBC   


 


RBC   


 


Hgb   


 


Hct   


 


MCV   


 


MCH   


 


MCHC   


 


RDW   


 


Plt Count   


 


MPV   


 


Neut % (Auto)   


 


Lymph % (Auto)   


 


Mono % (Auto)   


 


Eos % (Auto)   


 


Baso % (Auto)   


 


Neut # (Auto)   


 


Lymph # (Auto)   


 


Mono # (Auto)   


 


Eos # (Auto)   


 


Baso # (Auto)   


 


PT   


 


INR   


 


APTT   


 


Sodium   


 


Potassium   


 


Chloride   


 


Carbon Dioxide   


 


Anion Gap   


 


BUN   


 


Creatinine   


 


Est GFR ( Amer)   


 


Est GFR (Non-Af Amer)   


 


POC Glucose (mg/dL)   131 H 


 


Random Glucose   


 


Calcium   


 


Total Bilirubin   


 


AST   


 


ALT   


 


Alkaline Phosphatase   


 


Lactate Dehydrogenase   


 


Total Creatine Kinase    1213 H


 


CK-MB (Mass)    54.0 H


 


Troponin I    49.2000 H*


 


Total Protein   


 


Albumin   


 


Globulin   


 


Albumin/Globulin Ratio   


 


Blood Type  O POSITIVE  


 


Antibody Screen  Negative  














  04/09/19





  21:19


 


WBC 


 


RBC 


 


Hgb 


 


Hct 


 


MCV 


 


MCH 


 


MCHC 


 


RDW 


 


Plt Count 


 


MPV 


 


Neut % (Auto) 


 


Lymph % (Auto) 


 


Mono % (Auto) 


 


Eos % (Auto) 


 


Baso % (Auto) 


 


Neut # (Auto) 


 


Lymph # (Auto) 


 


Mono # (Auto) 


 


Eos # (Auto) 


 


Baso # (Auto) 


 


PT 


 


INR 


 


APTT 


 


Sodium 


 


Potassium 


 


Chloride 


 


Carbon Dioxide 


 


Anion Gap 


 


BUN 


 


Creatinine 


 


Est GFR ( Amer) 


 


Est GFR (Non-Af Amer) 


 


POC Glucose (mg/dL)  146 H


 


Random Glucose 


 


Calcium 


 


Total Bilirubin 


 


AST 


 


ALT 


 


Alkaline Phosphatase 


 


Lactate Dehydrogenase 


 


Total Creatine Kinase 


 


CK-MB (Mass) 


 


Troponin I 


 


Total Protein 


 


Albumin 


 


Globulin 


 


Albumin/Globulin Ratio 


 


Blood Type 


 


Antibody Screen 














Attending/Attestation





- Attestation


I have personally seen and examined this patient.: Yes


I have fully participated in the care of the patient.: Yes


I have reviewed all pertinent clinical information: Yes


Notes (Text): 


Patient seen, examined and case discussed with medical resident.


Patient noted for significant cardiac risk factors; noted ST elevations in V1-

V4, hx of coronary artery disease, hypertension, lipid disorder, and confirmed 

compliance on medications, with recent stent placement about one week ago. 

Cardiology on board. Patient underwent cardiac cath noted have re-occlusion of 

proximal LAD with 100% stenosis, as well as 75% stenosis of circumflex.  2 

stents placed to proximal LAD, with circumflex remaining partially-stenosed. 

Thoracic surgery evaluation in progress for possible CABG. Patient admitted to [facility] post cath. Further management per ICU and cardiology. Case discussed with 

both icu and cardiology. 





Assessment/Plan


1) Acute Coronary Syndrome


Abnormal EKG 


hx Coronary Artery Disease with recent stent placement 


Hypertension 


Lipid disorder


Assessment/Plan


Code heart 4/9/19 protocol including aspirin, loading brilinta, and heparin 

bolus


Cardiac cath


Cardiology on board (Dr. johns) on board


Aspirin, Beta blocker, statin, statin, ace held given borderline blood pressure


integrilin post cath


Echo ordered post cath


thoracic surgery evaluation given 2 vessel disease, high risk give Ostimetal for

 reocclusion when discussed with cardiologist





2) Prophylactic measure


d/c protonix  given recent stent placed; switched to pepcid


Patient is on integrilin post cath; d/c dvt ppx

## 2019-04-10 VITALS
TEMPERATURE: 99.1 F | RESPIRATION RATE: 13 BRPM | DIASTOLIC BLOOD PRESSURE: 60 MMHG | SYSTOLIC BLOOD PRESSURE: 99 MMHG | HEART RATE: 74 BPM

## 2019-04-10 LAB
ALBUMIN SERPL-MCNC: 4.4 G/DL (ref 3.5–5)
ALBUMIN/GLOB SERPL: 1.4 {RATIO} (ref 1–2.1)
ALT SERPL-CCNC: 38 U/L (ref 21–72)
AST SERPL-CCNC: 209 U/L (ref 17–59)
BASOPHILS # BLD AUTO: 0.1 K/UL (ref 0–0.2)
BASOPHILS NFR BLD: 0.7 % (ref 0–2)
BUN SERPL-MCNC: 19 MG/DL (ref 9–20)
CALCIUM SERPL-MCNC: 9.3 MG/DL (ref 8.6–10.4)
EOSINOPHIL # BLD AUTO: 0.9 K/UL (ref 0–0.7)
EOSINOPHIL NFR BLD: 7.5 % (ref 0–4)
ERYTHROCYTE [DISTWIDTH] IN BLOOD BY AUTOMATED COUNT: 13.3 % (ref 11.5–14.5)
GFR NON-AFRICAN AMERICAN: > 60
HDLC SERPL-MCNC: 28 MG/DL (ref 30–70)
HGB BLD-MCNC: 14 G/DL (ref 12–18)
LDLC SERPL-MCNC: 73 MG/DL (ref 0–129)
LYMPHOCYTES # BLD AUTO: 2.4 K/UL (ref 1–4.3)
LYMPHOCYTES NFR BLD AUTO: 20.1 % (ref 20–40)
MCH RBC QN AUTO: 31.7 PG (ref 27–31)
MCHC RBC AUTO-ENTMCNC: 34.6 G/DL (ref 33–37)
MCV RBC AUTO: 91.6 FL (ref 80–94)
MONOCYTES # BLD: 1.2 K/UL (ref 0–0.8)
MONOCYTES NFR BLD: 10.4 % (ref 0–10)
NEUTROPHILS # BLD: 7.2 K/UL (ref 1.8–7)
NEUTROPHILS NFR BLD AUTO: 61.3 % (ref 50–75)
NRBC BLD AUTO-RTO: 0.1 % (ref 0–2)
PLATELET # BLD: 261 K/UL (ref 130–400)
PMV BLD AUTO: 8.7 FL (ref 7.2–11.7)
RBC # BLD AUTO: 4.43 MIL/UL (ref 4.4–5.9)
WBC # BLD AUTO: 11.7 K/UL (ref 4.8–10.8)

## 2019-04-10 NOTE — CP.PCM.PN
Subjective





- Date & Time of Evaluation


Date of Evaluation: 04/10/19


Time of Evaluation: 09:30





- Subjective


Subjective: 


Progress note for Dr. Johns





Patient was seen and examined at bedside in no acute distress. Patient states 

his has mild tenderness in his right groin, but otherwise has no complaints 

today and denies having chest pain, palpitations, dyspnea, dizziness, headaches,

n/v, fevers, abdominal pain, leg pain.





Objective





- Vital Signs/Intake and Output


Vital Signs (last 24 hours): 


                                        











Temp Pulse Resp BP Pulse Ox


 


 99.1 F   74   13   99/60 L  94 L


 


 04/10/19 12:00  04/10/19 12:00  04/10/19 12:00  04/10/19 12:00  04/10/19 12:00








Intake and Output: 


                                        











 04/10/19 04/10/19





 06:59 18:59


 


Intake Total 438.4 600


 


Output Total 650 900


 


Balance -211.6 -300














- Medications


Medications: 


                               Current Medications





Aspirin (Ecotrin)  81 mg PO DAILY [facility]


   Last Admin: 04/10/19 09:39 Dose:  81 mg


Famotidine (Pepcid)  20 mg PO BID [facility]


   Last Admin: 04/10/19 09:36 Dose:  20 mg


Heparin Sodium (Porcine) (Heparin)  5,000 units SC Q8H [facility]


   Last Admin: 04/10/19 09:39 Dose:  5,000 units


Metoprolol Tartrate (Lopressor)  25 mg PO BID [facility]


   Last Admin: 04/10/19 09:39 Dose:  25 mg


Nitroglycerin (Nitrostat Sl Tab)  0.4 mg SL Q5M PRN


   PRN Reason: Other


   Last Admin: 04/09/19 10:48 Dose:  0.4 mg


Rosuvastatin Calcium (Crestor)  40 mg PO HS [facility]


   Last Admin: 04/09/19 22:58 Dose:  40 mg


Ticagrelor (Brilinta)  90 mg PO BID [facility]


   Last Admin: 04/10/19 09:38 Dose:  90 mg











- Labs


Labs: 


                                        





                                 04/10/19 05:54 





                                 04/10/19 05:52 





                                        











PT  13.0 SECONDS (9.7-12.2)  H  04/09/19  10:48    


 


INR  1.2   04/09/19  10:48    


 


APTT  31 SECONDS (21-34)   04/09/19  10:48    














- Constitutional


Appears: Well, No Acute Distress





- Head Exam


Head Exam: ATRAUMATIC, NORMAL INSPECTION





- Eye Exam


Eye Exam: EOMI





- ENT Exam


ENT Exam: Mucous Membranes Moist





- Respiratory Exam


Respiratory Exam: Clear to Ausculation Bilateral, NORMAL BREATHING PATTERN.  

absent: Rales, Rhonchi, Wheezes





- Cardiovascular Exam


Cardiovascular Exam: REGULAR RHYTHM, +S1, +S2





- GI/Abdominal Exam


GI & Abdominal Exam: Soft, Normal Bowel Sounds.  absent: Distended, Tenderness





- Extremities Exam


Extremities Exam: Tenderness (mild, right groin s/p cath).  absent: Pedal Edema


Additional comments: 


ecchymosis in left groin





- Neurological Exam


Neurological Exam: Alert, Awake, Oriented x3





- Skin


Skin Exam: Dry, Warm


Additional comments: 


ecchymosis in the right and left groin s/p cath





Assessment and Plan





- Assessment and Plan (Free Text)


Plan: 


54 year old male with a history of HTN, HLD, and CAD who presented with acute 

onset chest pain. Patient was a code heart for STEMI in the ED.





STEMI


- s/p cardiac cath (4/9/19): LAD was stented; Left Cx Ostial 70% stenosis; EF 

40%


- Of note, patient had a cardiac cath and stent placed on 4/2/19; however, stent

became occluded.


- Echo: mild concentric LVH; systolic function mildly enlarged; EF 50%; moderate

hypokinesis in the anteroseptal-apical septal walls compatible w/coronary heart 

disease most likely involving the LAD territory; gradeII diastolic dysfunction; 

no thrombus;no valve abnormality, no effusion.


- Continue Aspirin 81mg PO daily, Brillinta 90mg PO BID, Metoprolol 25mg PO BID,

Crestor 40mg PO HS





** Patient will be transferred to [facility] for CABG, accepting physician- Dr Limon





Case discussed with Dr. Andreas Negro, PGY2

## 2019-04-10 NOTE — CARD
--------------- APPROVED REPORT --------------





Date of service: 04/10/2019



EXAM: Two-dimensional and M-mode echocardiogram with Doppler and 

color Doppler.



Other Information 

Quality : GoodRhythm : 



INDICATION

LV Function:Systolic Status/Post MI 



2D DIMENSIONS 

IVSd1.3   (0.7-1.1cm)LVDd4.1   (3.9-5.9cm)

PWd1.3   (0.7-1.1cm)LA [id number]   (18-58mL)

LVDs3.0   (2.5-4.0cm)FS (%) 25.4   %

LVEF (%)50.6   (>50%)LVEF (Francis's)52.26 %



M-Mode DIMENSIONS 

Left Atrium (MM)3.16   (2.5-4.0cm)IVSd1.22   (0.7-1.1cm)

Aortic Root3.77   (2.2-3.7cm)LVDd5.30   (4.0-5.6cm)

Aortic Cusp Exc.2.09   (1.5-2.0cm)PWd1.15   (0.7-1.1cm)

FS (%) 26   %LVDs3.91   (2.0-3.8cm)

LVEF (%)51   (>50%)



Mitral Valve

MV E Vqpymshn82.4cm/sMV A Wrdajoiy12.8cm/sE/A ratio1.1



TDI

Lateral E' Peak V7.00cm/sMedial E' Peak V5.90cm/sE/Lateral E'12.5

E/Medial E'14.8



Tricuspid Valve

TR Peak Lvbyiilb761jp/sTR Peak [id number]



 LEFT VENTRICLE 

The left ventricle is normal size.

There is mild concentric left ventricular hypertrophy.

The systolic function is mildly impaired.

The Ejection Fraction is - 50%.

There is  moderate hypokinesis in the anteroseptal apical septal wall 

compatible with coronary heart disease probably involving the LAD 

territory.

Grade II- diastolic dysfunction can not be exdcluded. Valsalva's not 

available

No left ventricle thrombus noted on this study.



 RIGHT VENTRICLE 

The right ventricle is normal size.

The right ventricular systolic function is normal.



 ATRIA 

The left atrium size is normal.

The right atrium size is normal.



 AORTIC VALVE 

The aortic valve is normal in structure.

No aortic regurgitation is present.

There is no aortic valvular stenosis. 



 MITRAL VALVE 

The mitral valve is normal in structure.

Mitral annular calcification is mild.

There is no mitral valve regurgitation noted.



 TRICUSPID VALVE 

The tricuspid valve is normal in structure.

There is mild tricuspid regurgitation.

Right ventricular systolic pressure is estimated at less than 30 

mmHg. 

There is no pulmonary hypertension.



 PULMONIC VALVE 

The pulmonary valve is normal in structure.



 GREAT VESSELS 

The aortic root is normal in size.

The IVC is normal in size and collapses >50% with inspiration.



 PERICARDIAL EFFUSION 

There is no pericardial effusion.





<Conclusion>

There is mild concentric left ventricular hypertrophy. The systolic 

function is mildly impaired. The Ejection Fraction is - 50%.

There is  moderate hypokinesis in the anteroseptal -  apical septal 

walls compatible with coronary heart disease most likely involving 

the LAD territory.

Grade II- diastolic dysfunction can not be exdcluded. Valsalva's not 

available

No left ventricle thrombus noted on this study.

The right ventricular systolic function is normal.

No significant valvular abnormality.

There is no pericardial effusion.

## 2019-04-10 NOTE — CP.PCM.DIS
Provider





- Provider


Date of Admission: 


19 10:53





Attending physician: 


Beth De La Cruz MD





Consults: 








19 11:29


Critical Care Consult Stat 


   Comment: 


   Consulting Provider: Laron Ascencio


   Consulting Physician: Laron Ascencio


   Reason for Consult: Code heart





19 13:22


Cardiology Consult Routine 


   Comment: 


   Consulting Provider: Solitario Johns


   Consulting Physician: Solitario Johns


   Reason for Consult: Acute MI





19 16:18


Physician Consult Routine 


   Comment: 


   Consulting Provider: Jose Armando Limon


   Consulting Physician: Jose Armando Limon


   Reason for Consult: Acute MI s/p LAD stent, assess for possible CABG











Time Spent in preparation of Discharge (in minutes): 31





Diagnosis





- Discharge Diagnosis


(1) 2-vessel coronary artery disease


Status: Acute   


Comment: Cardiology: Dr. Johns on case help appreciated.  Cardiothoracic surgery:

Dr. Limon on case help appreciated.  patient [place] [facility] [place]  [place] s/p STEMI 19.  LAD stented 19.  L Cx: Ostial 70% 

stenosis.  EF 40%.  Echocardiogram (4/3/19): left ventricle systolic function is

normal. EF: 60-65%, hypertensive heart disease, diastolic dysfunction, no aortic

regurgitation is present, mild to moderate tricupsid regurgitation, mild 

pulmonary hypertension, mild pulmonic valvuar regurgitation.  Pending 

echocardiogram.  CDS from two recent caths to be provided upon transfer.  

Aspirin, brilinta, statin, beta blocker   





(2) Coronary artery disease


Status: Acute   


Comment: Cardiology: Dr. Johns on case help appreciated.  Cardiothoracic surgery:

Dr. Limon on case help appreciated.  patient accepted [place] 

[place] [facility] [place]  Patient s/p STEMI 19.  LAD stented 19.  L Cx: 

Ostial 70% stenosis.  EF 40%.  Echocardiogram (4/3/19): left ventricle systolic 

function is normal. EF: 60-65%, hypertensive heart disease, diastolic dysfunctio

n, no aortic regurgitation is present, mild to moderate tricupsid regurgitation,

mild pulmonary hypertension, mild pulmonic valvuar regurgitation.  Pending 

echocardiogram.  CDS from two recent caths to be provided upon transfer.  

Aspirin, brilinta, statin, beta blocker   





(3) Hypertension


Status: Chronic   


Comment: Lopressor 25mg PO BID   





(4) Lipid disorder


Status: Chronic   


Comment: T, Chol: 125, LDL: 73, HDL: 28.  Crestor 40mg POqHS   





(5) STEMI (ST elevation myocardial infarction)


Status: Acute   


Comment: [place] [facility] [place]  [place] s/p 

STEMI 19.  LAD stented 19.  L Cx: Ostial 70% stenosis.  EF 40%.  

Echocardiogram (4/3/19): left ventricle systolic function is normal. EF: 60-65%,

hypertensive heart disease, diastolic dysfunction, no aortic regurgitation is 

present, mild to moderate tricupsid regurgitation, mild pulmonary hypertension, 

mild pulmonic valvuar regurgitation.  Pending echocardiogram.  CDS from two 

recent caths to be provided upon transfer.  Aspirin, brilinta, statin, beta 

blocker   





Hospital Course





- Lab Results


Lab Results: 


                             Most Recent Lab Values











WBC  11.7 K/uL (4.8-10.8)  H  04/10/19  05:54    


 


RBC  4.43 Mil/uL (4.40-5.90)   04/10/19  05:54    


 


Hgb  14.0 g/dL (12.0-18.0)   04/10/19  05:54    


 


Hct  40.6 % (35.0-51.0)   04/10/19  05:54    


 


MCV  91.6 fL (80.0-94.0)   04/10/19  05:54    


 


MCH  31.7 pg (27.0-31.0)  H  04/10/19  05:54    


 


MCHC  34.6 g/dL (33.0-37.0)   04/10/19  05:54    


 


RDW  13.3 % (11.5-14.5)   04/10/19  05:54    


 


Plt Count  261 K/uL (130-400)   04/10/19  05:54    


 


MPV  8.7 fL (7.2-11.7)   04/10/19  05:54    


 


Neut % (Auto)  61.3 % (50.0-75.0)   04/10/19  05:54    


 


Lymph % (Auto)  20.1 % (20.0-40.0)   04/10/19  05:54    


 


Mono % (Auto)  10.4 % (0.0-10.0)  H  04/10/19  05:54    


 


Eos % (Auto)  7.5 % (0.0-4.0)  H  04/10/19  05:54    


 


Baso % (Auto)  0.7 % (0.0-2.0)   04/10/19  05:54    


 


Neut # (Auto)  7.2 K/uL (1.8-7.0)  H  04/10/19  05:54    


 


Lymph # (Auto)  2.4 K/uL (1.0-4.3)   04/10/19  05:54    


 


Mono # (Auto)  1.2 K/uL (0.0-0.8)  H  04/10/19  05:54    


 


Eos # (Auto)  0.9 K/uL (0.0-0.7)  H  04/10/19  05:54    


 


Baso # (Auto)  0.1 K/uL (0.0-0.2)   04/10/19  05:54    


 


PT  13.0 SECONDS (9.7-12.2)  H  19  10:48    


 


INR  1.2   19  10:48    


 


APTT  31 SECONDS (21-34)   19  10:48    


 


Sodium  131 mmol/L (132-148)  L  04/10/19  05:52    


 


Potassium  4.3 mmol/L (3.6-5.2)   04/10/19  05:52    


 


Chloride  99 mmol/L ()   04/10/19  05:52    


 


Carbon Dioxide  25 mmol/L (22-30)   04/10/19  05:52    


 


Anion Gap  12  (10-20)   04/10/19  05:52    


 


BUN  19 mg/dL (9-20)   04/10/19  05:52    


 


Creatinine  0.9 mg/dL (0.8-1.5)   04/10/19  05:52    


 


Est GFR ( Amer)  > 60   04/10/19  05:52    


 


Est GFR (Non-Af Amer)  > 60   04/10/19  05:52    


 


POC Glucose (mg/dL)  131 mg/dL ()  H  04/10/19  07:19    


 


Random Glucose  112 mg/dL ()  H D 04/10/19  05:52    


 


Calcium  9.3 mg/dl (8.6-10.4)   04/10/19  05:52    


 


Phosphorus  4.1 mg/dL (2.5-4.5)   04/10/19  05:52    


 


Magnesium  2.1 mg/dL (1.6-2.3)   04/10/19  05:52    


 


Total Bilirubin  0.9 mg/dL (0.2-1.3)   04/10/19  05:52    


 


AST  209 U/L (17-59)  H D 04/10/19  05:52    


 


ALT  38 U/L (21-72)   04/10/19  05:52    


 


Alkaline Phosphatase  89 U/L ()   04/10/19  05:52    


 


Lactate Dehydrogenase  791 U/L (313-618)  H  19  10:48    


 


Total Creatine Kinase  1213 U/L ()  H  19  17:09    


 


CK-MB (Mass)  54.0 ng/mL (0.0-3.38)  H  19  17:09    


 


Troponin I  49.2000 ng/mL (0.00-0.120)  H*  19  17:09    


 


Total Protein  7.5 g/dL (6.3-8.3)   04/10/19  05:52    


 


Albumin  4.4 g/dL (3.5-5.0)   04/10/19  05:52    


 


Globulin  3.0 gm/dL (2.2-3.9)   04/10/19  05:52    


 


Albumin/Globulin Ratio  1.4  (1.0-2.1)   04/10/19  05:52    


 


Triglycerides  142 mg/dL (0-149)   04/10/19  05:52    


 


Cholesterol  125 mg/dL (0-199)   04/10/19  05:52    


 


LDL Cholesterol Direct  73 mg/dL (0-129)   04/10/19  05:52    


 


HDL Cholesterol  28 mg/dL (30-70)  L  04/10/19  05:52    


 


TSH 3rd Generation  Cancelled   04/10/19  05:52    


 


Blood Type  O POSITIVE   19  10:48    


 


Antibody Screen  Negative   19  10:48    














- Hospital Course


Hospital Course: 





As per H&P


"Patient is a 54 year old male with PMHx HTN, HLD, and CAD who presents with 

acute onset chest pain ending up called as a code heart for STEMI in the ED.  

Patient had a recent STEMI on last admission at [facility] one week ago and was t

aken to cath lab where bare metal stent was placed for 100% occluded proximal 

LAD.  Patient was discharged on appropriate medical therapy but per patient may 

have stopped taking Plavix and only taking ASA upon discharge.  On presentation 

today patient states he was sitting on the couch watching TV when we felt 

sudden-onset squeezing left-sided chest pain 10/10 and new immediately to come 

to hospital bc he felt he was having a heart attack.  Code heart called in ED 

for STEMI with ST elevations in V1-V4 with elevated troponins.  Code heart 

initiated and patient taken directly to cath lab for PCI with Dr. Johns.





PMHx: HTN


PSHx: Denies


Allergies: NKDA


Social: occasional alcohol use, denies tobacco and illicit drugs. Works as a 

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


Family Hx: Denies hx of CAD, HTN, MI, DM, stroke and cancer





Meds: Recently discharged from [facility] on the following medications (and

confirmed)


-Clopidogrel 75 mg, 1 tablet by mouth once a day at 8 AM


-Atorvastatin 40 mg, 1 tablet by mouth once a day at 8 PM


-Lisinopril 2.5 mg, 1 tablet by mouth once a day at 2 PM


-Aspirin 81 mg, 1 tablet by mouth once a day at 8 AM


-Metoprolol 12.5 mg, 1 tablet by mouth two time a day at 8 AM and 8 PM"





Patient admitted to ICU following post cardiac catherization given ST elevations

in the anterior leads, hx of CAD, recent stent placement about one week ago, 

hypertension, lipid disorder. Cardiology on board. Thoracic surgery evaluated 

patient; [place] [facility] [place]. 

Noted; results of cardiac cath noted in discharge diagnoses:





[place] will [place] [facility] [place] not 

performed at this institution presently for patient with prominent with 2 vessel

dx with extensive cardiac history, recent stent placement one week ago, and ST 

elevations noted on this admission and confirmed compliance on medications. 





This is a brief summary of patient's hospitalization. Please refer to EMR for 

full detail of record. 





- Date & Time of H&P


Date of H&P: 19


Time of H&P: 15:14





Discharge Exam





- Head Exam


Head Exam: ATRAUMATIC, NORMAL INSPECTION, NORMOCEPHALIC





- Eye Exam


Eye Exam: EOMI


Pupil Exam: PERRL





- ENT Exam


ENT Exam: Mucous Membranes Moist





- Respiratory Exam


Respiratory Exam: Clear to PA & Lateral, NORMAL BREATHING PATTERN.  absent: 

Rales, Rhonchi





- Cardiovascular Exam


Cardiovascular Exam: REGULAR RHYTHM, +S1, +S2, +S4





- GI/Abdominal Exam


GI & Abdominal Exam: Normal Bowel Sounds, Soft.  absent: Distended, Firm, 

Guarding, Rebound, Rigid, Tenderness





-  Exam


Additional comments: 





right groin cath site clean/dry/intact





- Extremities Exam


Extremities exam: pedal pulses present





- Neurological Exam


Neurological exam: Alert, Oriented x3





- Psychiatric Exam


Psychiatric exam: Normal Affect, Normal Mood





- Skin


Skin Exam: Dry, Intact, Normal Color, Warm





Discharge Plan





- Follow Up Plan


Condition: STABLE


Disposition: OTHER INSTITUTION

## 2019-04-15 VITALS — OXYGEN SATURATION: 99 %

## 2019-04-17 NOTE — CARDCATH
PROCEDURE DATE:  04/09/2019



PROCEDURES:

1.  Left heart catheterization.

2.  Coronary angiogram.

3.  Left anterior descending coronary artery balloon angioplasty and

drug-eluting stent placement.



REFERRING PHYSICIAN:  Romana Huffman DO



PERFORMING PHYSICIAN:  Solitario Johns MD



CLINICAL INDICATIONS:

1.  Acute recurrent anterior ST-elevation myocardial infarction.

2.  Coronary artery disease, history of LAD stent.

3.  Hypertension.

4.  Hyperlipidemia.



BRIEF CLINICAL HISTORY:  Bj Wheeler is a 54-year-old gentleman who

presented to [facility] one week ago with acute anterior wall

ST-elevation myocardial infarction.  At that time, code heart was

performed.  The patient had a stent in the LAD.  This time again, the

patient presented with another ST-elevation myocardial infarction.  Code

heart was activated.  The patient was emergently taken to cardiac cath lab.

The patient was treated with aspirin, Brilinta, and IV heparin.



DESCRIPTION OF PROCEDURE:  After informed consent, the patient was prepped

and draped in the usual sterile fashion.  Lidocaine 2% was given in the

right groin for local anesthesia.  Using micropuncture technique, a

6-Lao sheath was introduced into the right common femoral artery.



Adequate IV heparin was given.  ACT was maintained about 250 throughout the

procedure.



XBLAD 3.5 guiding catheter engaged into the left main coronary artery. 

Contrast was injected and left coronary angiogram was noted.  Left main

coronary artery is patent.  LAD has 100% ostial in-stent occlusion.  There

is MANISH 0 flow distally.  Left circumflex has ostial 90% to 95% hazy

stenosis, which is most likely secondary to prior stent placement in the

LAD.  Proximal, mid circumflex, and obtuse marginal branches are patent. 

Right coronary angiogram was done.  As it was normal a weak ago.  LV

ejection fraction was approximately 45% to 50%.  Mild apical and global

hypokinesis.  EDP is 20.  No gradient across the aortic valve.



The patient has 100% ostial LAD disease and 90% to 95% ostial _____

stenosis.  Since the patient is having ongoing ST-elevation myocardial

infarction and chest pain, decided to fix the culprit LAD artery.



LAD is threaded with run through coronary wire.  The lesion was pre-dilated

using 2.5 x 12 balloon multiple times.  The area was stented using 2.75 x

24 drug-eluting Resolute [place] stent.  Mid LAD was stented with 2.25 x 20

Resolute [place] stent.  The overlap area was post dilated.



Postprocedure, the patient began chest pain free and hemodynamically

stable.



CONCLUSION:  The patient with recurrent stenosis of the LAD artery.  The

patient presented with recurrent anterior ST-elevation myocardial

infarction.  The culprit artery is fixed.  The patient is chest pain free

and hemodynamically stable.  However, the patient will require complete

revascularization.  For this, I would recommend open heart surgery with

[id number] and left circumflex.



The patent will be transferred to intensive care unit for further

management.  Once the patient is stable, we will transfer the patient to

nearest open heart surgery for coronary artery bypass grafting.







__________________________________________

Solitario Johns MD





DD:  04/16/2019 18:39:20

DT:  04/16/2019 23:34:04

Job # [id number]